# Patient Record
Sex: MALE | Race: WHITE | NOT HISPANIC OR LATINO | Employment: UNEMPLOYED | URBAN - METROPOLITAN AREA
[De-identification: names, ages, dates, MRNs, and addresses within clinical notes are randomized per-mention and may not be internally consistent; named-entity substitution may affect disease eponyms.]

---

## 2023-12-04 ENCOUNTER — TRANSCRIBE ORDERS (OUTPATIENT)
Dept: VASCULAR SURGERY | Facility: CLINIC | Age: 28
End: 2023-12-04

## 2023-12-04 DIAGNOSIS — I82.409 DVT (DEEP VENOUS THROMBOSIS) (H): Primary | ICD-10-CM

## 2023-12-05 ENCOUNTER — ANCILLARY PROCEDURE (OUTPATIENT)
Dept: VASCULAR ULTRASOUND | Facility: CLINIC | Age: 28
End: 2023-12-05
Attending: PODIATRIST
Payer: COMMERCIAL

## 2023-12-05 DIAGNOSIS — I82.409 DVT (DEEP VENOUS THROMBOSIS) (H): ICD-10-CM

## 2023-12-05 PROCEDURE — 93971 EXTREMITY STUDY: CPT | Mod: LT

## 2024-10-31 ENCOUNTER — APPOINTMENT (OUTPATIENT)
Dept: CT IMAGING | Facility: HOSPITAL | Age: 29
End: 2024-10-31
Attending: EMERGENCY MEDICINE

## 2024-10-31 ENCOUNTER — APPOINTMENT (OUTPATIENT)
Dept: RADIOLOGY | Facility: HOSPITAL | Age: 29
End: 2024-10-31
Attending: HOSPITALIST

## 2024-10-31 ENCOUNTER — NURSE TRIAGE (OUTPATIENT)
Dept: FAMILY MEDICINE | Facility: CLINIC | Age: 29
End: 2024-10-31

## 2024-10-31 ENCOUNTER — HOSPITAL ENCOUNTER (OUTPATIENT)
Facility: HOSPITAL | Age: 29
Setting detail: OBSERVATION
Discharge: HOME | End: 2024-11-01
Attending: EMERGENCY MEDICINE | Admitting: STUDENT IN AN ORGANIZED HEALTH CARE EDUCATION/TRAINING PROGRAM

## 2024-10-31 DIAGNOSIS — N20.1 URETERAL STONE: Primary | ICD-10-CM

## 2024-10-31 DIAGNOSIS — R11.2 NAUSEA AND VOMITING, UNSPECIFIED VOMITING TYPE: ICD-10-CM

## 2024-10-31 DIAGNOSIS — R10.32 ABDOMINAL PAIN, LEFT LOWER QUADRANT: ICD-10-CM

## 2024-10-31 DIAGNOSIS — N20.1 URETEROLITHIASIS: ICD-10-CM

## 2024-10-31 DIAGNOSIS — N17.9 ACUTE KIDNEY INJURY (H): ICD-10-CM

## 2024-10-31 PROBLEM — E66.01 MORBID OBESITY (H): Status: ACTIVE | Noted: 2024-10-31

## 2024-10-31 PROBLEM — R79.82 CRP ELEVATED: Status: ACTIVE | Noted: 2024-10-31

## 2024-10-31 PROBLEM — N30.00 ACUTE CYSTITIS WITHOUT HEMATURIA: Status: ACTIVE | Noted: 2024-10-31

## 2024-10-31 LAB
ALBUMIN SERPL BCG-MCNC: 4.5 G/DL (ref 3.5–5.2)
ALBUMIN UR-MCNC: 20 MG/DL
ALP SERPL-CCNC: 89 U/L (ref 40–150)
ALT SERPL W P-5'-P-CCNC: 57 U/L (ref 0–70)
ANION GAP SERPL CALCULATED.3IONS-SCNC: 13 MMOL/L (ref 7–15)
APPEARANCE UR: CLEAR
AST SERPL W P-5'-P-CCNC: 29 U/L (ref 0–45)
BILIRUB DIRECT SERPL-MCNC: <0.2 MG/DL (ref 0–0.3)
BILIRUB SERPL-MCNC: 0.7 MG/DL
BILIRUB UR QL STRIP: NEGATIVE
BUN SERPL-MCNC: 11.8 MG/DL (ref 6–20)
CALCIUM SERPL-MCNC: 9.2 MG/DL (ref 8.8–10.4)
CHLORIDE SERPL-SCNC: 103 MMOL/L (ref 98–107)
COLOR UR AUTO: ABNORMAL
CREAT SERPL-MCNC: 1.35 MG/DL (ref 0.67–1.17)
CRP SERPL-MCNC: 15.6 MG/L
EGFRCR SERPLBLD CKD-EPI 2021: 72 ML/MIN/1.73M2
ERYTHROCYTE [DISTWIDTH] IN BLOOD BY AUTOMATED COUNT: 12.8 % (ref 10–15)
GLUCOSE SERPL-MCNC: 109 MG/DL (ref 70–99)
GLUCOSE UR STRIP-MCNC: NEGATIVE MG/DL
HCO3 SERPL-SCNC: 24 MMOL/L (ref 22–29)
HCT VFR BLD AUTO: 43 % (ref 40–53)
HGB BLD-MCNC: 14.7 G/DL (ref 13.3–17.7)
HGB UR QL STRIP: ABNORMAL
KETONES UR STRIP-MCNC: ABNORMAL MG/DL
LACTATE SERPL-SCNC: 1.1 MMOL/L (ref 0.7–2)
LEUKOCYTE ESTERASE UR QL STRIP: NEGATIVE
LIPASE SERPL-CCNC: 13 U/L (ref 13–60)
MCH RBC QN AUTO: 28.6 PG (ref 26.5–33)
MCHC RBC AUTO-ENTMCNC: 34.2 G/DL (ref 31.5–36.5)
MCV RBC AUTO: 84 FL (ref 78–100)
MUCOUS THREADS #/AREA URNS LPF: PRESENT /LPF
NITRATE UR QL: NEGATIVE
PH UR STRIP: 6 [PH] (ref 5–7)
PLATELET # BLD AUTO: 259 10E3/UL (ref 150–450)
POTASSIUM SERPL-SCNC: 4.1 MMOL/L (ref 3.4–5.3)
PROT SERPL-MCNC: 7.2 G/DL (ref 6.4–8.3)
RBC # BLD AUTO: 5.14 10E6/UL (ref 4.4–5.9)
RBC URINE: 135 /HPF
SODIUM SERPL-SCNC: 140 MMOL/L (ref 135–145)
SP GR UR STRIP: 1.01 (ref 1–1.03)
UROBILINOGEN UR STRIP-MCNC: <2 MG/DL
WBC # BLD AUTO: 16 10E3/UL (ref 4–11)
WBC URINE: 2 /HPF

## 2024-10-31 PROCEDURE — 96361 HYDRATE IV INFUSION ADD-ON: CPT

## 2024-10-31 PROCEDURE — 96365 THER/PROPH/DIAG IV INF INIT: CPT | Mod: 59

## 2024-10-31 PROCEDURE — 120N000001 HC R&B MED SURG/OB

## 2024-10-31 PROCEDURE — 96374 THER/PROPH/DIAG INJ IV PUSH: CPT | Mod: 59

## 2024-10-31 PROCEDURE — 96375 TX/PRO/DX INJ NEW DRUG ADDON: CPT

## 2024-10-31 PROCEDURE — 258N000003 HC RX IP 258 OP 636: Performed by: HOSPITALIST

## 2024-10-31 PROCEDURE — 99285 EMERGENCY DEPT VISIT HI MDM: CPT | Mod: 25

## 2024-10-31 PROCEDURE — 71045 X-RAY EXAM CHEST 1 VIEW: CPT

## 2024-10-31 PROCEDURE — 99223 1ST HOSP IP/OBS HIGH 75: CPT | Performed by: HOSPITALIST

## 2024-10-31 PROCEDURE — 93005 ELECTROCARDIOGRAM TRACING: CPT | Performed by: HOSPITALIST

## 2024-10-31 PROCEDURE — 82947 ASSAY GLUCOSE BLOOD QUANT: CPT | Performed by: EMERGENCY MEDICINE

## 2024-10-31 PROCEDURE — 250N000011 HC RX IP 250 OP 636: Performed by: EMERGENCY MEDICINE

## 2024-10-31 PROCEDURE — 83690 ASSAY OF LIPASE: CPT | Performed by: EMERGENCY MEDICINE

## 2024-10-31 PROCEDURE — 83605 ASSAY OF LACTIC ACID: CPT | Performed by: EMERGENCY MEDICINE

## 2024-10-31 PROCEDURE — 258N000003 HC RX IP 258 OP 636: Performed by: EMERGENCY MEDICINE

## 2024-10-31 PROCEDURE — 36415 COLL VENOUS BLD VENIPUNCTURE: CPT | Performed by: EMERGENCY MEDICINE

## 2024-10-31 PROCEDURE — G0378 HOSPITAL OBSERVATION PER HR: HCPCS

## 2024-10-31 PROCEDURE — 81001 URINALYSIS AUTO W/SCOPE: CPT | Performed by: EMERGENCY MEDICINE

## 2024-10-31 PROCEDURE — 86140 C-REACTIVE PROTEIN: CPT | Performed by: EMERGENCY MEDICINE

## 2024-10-31 PROCEDURE — 74177 CT ABD & PELVIS W/CONTRAST: CPT

## 2024-10-31 PROCEDURE — 85027 COMPLETE CBC AUTOMATED: CPT | Performed by: EMERGENCY MEDICINE

## 2024-10-31 PROCEDURE — 82248 BILIRUBIN DIRECT: CPT | Performed by: EMERGENCY MEDICINE

## 2024-10-31 PROCEDURE — 250N000011 HC RX IP 250 OP 636: Performed by: HOSPITALIST

## 2024-10-31 RX ORDER — HYDROMORPHONE HCL IN WATER/PF 6 MG/30 ML
0.4 PATIENT CONTROLLED ANALGESIA SYRINGE INTRAVENOUS
Status: DISCONTINUED | OUTPATIENT
Start: 2024-10-31 | End: 2024-11-01 | Stop reason: HOSPADM

## 2024-10-31 RX ORDER — CEFTRIAXONE 1 G/1
1 INJECTION, POWDER, FOR SOLUTION INTRAMUSCULAR; INTRAVENOUS EVERY 24 HOURS
Status: DISCONTINUED | OUTPATIENT
Start: 2024-10-31 | End: 2024-10-31

## 2024-10-31 RX ORDER — ONDANSETRON 4 MG/1
4 TABLET, ORALLY DISINTEGRATING ORAL EVERY 6 HOURS PRN
Status: DISCONTINUED | OUTPATIENT
Start: 2024-10-31 | End: 2024-11-01 | Stop reason: HOSPADM

## 2024-10-31 RX ORDER — CEFTRIAXONE 2 G/1
2 INJECTION, POWDER, FOR SOLUTION INTRAMUSCULAR; INTRAVENOUS EVERY 24 HOURS
Status: DISCONTINUED | OUTPATIENT
Start: 2024-10-31 | End: 2024-10-31

## 2024-10-31 RX ORDER — ACETAMINOPHEN 650 MG/1
650 SUPPOSITORY RECTAL EVERY 4 HOURS PRN
Status: DISCONTINUED | OUTPATIENT
Start: 2024-10-31 | End: 2024-11-01 | Stop reason: HOSPADM

## 2024-10-31 RX ORDER — AMOXICILLIN 250 MG
2 CAPSULE ORAL 2 TIMES DAILY PRN
Status: DISCONTINUED | OUTPATIENT
Start: 2024-10-31 | End: 2024-11-01 | Stop reason: HOSPADM

## 2024-10-31 RX ORDER — FLUTICASONE PROPIONATE AND SALMETEROL 500; 50 UG/1; UG/1
1 POWDER RESPIRATORY (INHALATION) EVERY MORNING
COMMUNITY
Start: 2024-07-10

## 2024-10-31 RX ORDER — KETOROLAC TROMETHAMINE 15 MG/ML
15 INJECTION, SOLUTION INTRAMUSCULAR; INTRAVENOUS ONCE
Status: COMPLETED | OUTPATIENT
Start: 2024-10-31 | End: 2024-10-31

## 2024-10-31 RX ORDER — ALBUTEROL SULFATE 90 UG/1
2 INHALANT RESPIRATORY (INHALATION) EVERY 6 HOURS PRN
COMMUNITY

## 2024-10-31 RX ORDER — ONDANSETRON 2 MG/ML
4 INJECTION INTRAMUSCULAR; INTRAVENOUS ONCE
Status: COMPLETED | OUTPATIENT
Start: 2024-10-31 | End: 2024-10-31

## 2024-10-31 RX ORDER — ONDANSETRON 2 MG/ML
4 INJECTION INTRAMUSCULAR; INTRAVENOUS EVERY 6 HOURS PRN
Status: DISCONTINUED | OUTPATIENT
Start: 2024-10-31 | End: 2024-11-01 | Stop reason: HOSPADM

## 2024-10-31 RX ORDER — AMOXICILLIN 250 MG
1 CAPSULE ORAL 2 TIMES DAILY PRN
Status: DISCONTINUED | OUTPATIENT
Start: 2024-10-31 | End: 2024-11-01 | Stop reason: HOSPADM

## 2024-10-31 RX ORDER — ACETAMINOPHEN 325 MG/1
650 TABLET ORAL EVERY 4 HOURS PRN
Status: DISCONTINUED | OUTPATIENT
Start: 2024-10-31 | End: 2024-11-01 | Stop reason: HOSPADM

## 2024-10-31 RX ORDER — OXYCODONE HYDROCHLORIDE 5 MG/1
5 TABLET ORAL EVERY 4 HOURS PRN
Status: DISCONTINUED | OUTPATIENT
Start: 2024-10-31 | End: 2024-11-01 | Stop reason: HOSPADM

## 2024-10-31 RX ORDER — IBUPROFEN 400 MG/1
400 TABLET, FILM COATED ORAL EVERY 8 HOURS PRN
COMMUNITY

## 2024-10-31 RX ORDER — IOPAMIDOL 755 MG/ML
100 INJECTION, SOLUTION INTRAVASCULAR ONCE
Status: COMPLETED | OUTPATIENT
Start: 2024-10-31 | End: 2024-10-31

## 2024-10-31 RX ORDER — ESOMEPRAZOLE MAGNESIUM 40 MG/1
40 CAPSULE, DELAYED RELEASE ORAL
COMMUNITY

## 2024-10-31 RX ORDER — HYDROMORPHONE HCL IN WATER/PF 6 MG/30 ML
0.2 PATIENT CONTROLLED ANALGESIA SYRINGE INTRAVENOUS
Status: DISCONTINUED | OUTPATIENT
Start: 2024-10-31 | End: 2024-11-01 | Stop reason: HOSPADM

## 2024-10-31 RX ADMIN — SODIUM CHLORIDE 1000 ML: 9 INJECTION, SOLUTION INTRAVENOUS at 19:51

## 2024-10-31 RX ADMIN — CEFTRIAXONE SODIUM 2 G: 2 INJECTION, POWDER, FOR SOLUTION INTRAMUSCULAR; INTRAVENOUS at 21:58

## 2024-10-31 RX ADMIN — ONDANSETRON 4 MG: 2 INJECTION INTRAMUSCULAR; INTRAVENOUS at 19:46

## 2024-10-31 RX ADMIN — HYDROMORPHONE HYDROCHLORIDE 0.5 MG: 1 INJECTION, SOLUTION INTRAMUSCULAR; INTRAVENOUS; SUBCUTANEOUS at 21:49

## 2024-10-31 RX ADMIN — SODIUM CHLORIDE 1000 ML: 9 INJECTION, SOLUTION INTRAVENOUS at 22:28

## 2024-10-31 RX ADMIN — IOPAMIDOL 100 ML: 755 INJECTION, SOLUTION INTRAVENOUS at 20:58

## 2024-10-31 RX ADMIN — KETOROLAC TROMETHAMINE 15 MG: 15 INJECTION, SOLUTION INTRAMUSCULAR; INTRAVENOUS at 19:47

## 2024-10-31 RX ADMIN — FAMOTIDINE 20 MG: 10 INJECTION, SOLUTION INTRAVENOUS at 19:51

## 2024-10-31 ASSESSMENT — ACTIVITIES OF DAILY LIVING (ADL)
ADLS_ACUITY_SCORE: 0
ADLS_ACUITY_SCORE: 0

## 2024-10-31 ASSESSMENT — COLUMBIA-SUICIDE SEVERITY RATING SCALE - C-SSRS
6. HAVE YOU EVER DONE ANYTHING, STARTED TO DO ANYTHING, OR PREPARED TO DO ANYTHING TO END YOUR LIFE?: NO
2. HAVE YOU ACTUALLY HAD ANY THOUGHTS OF KILLING YOURSELF IN THE PAST MONTH?: NO
1. IN THE PAST MONTH, HAVE YOU WISHED YOU WERE DEAD OR WISHED YOU COULD GO TO SLEEP AND NOT WAKE UP?: NO

## 2024-10-31 NOTE — LETTER
[facility] EMERGENCY DEPARTMENT  [address] [facility] [address]  Phone: [phone]    November 1, 2024        Yusef Camp  [facility] [facility] [address] [place] [id number]          To whom it may concern:    RE: Yusef Camp    Patient was seen admitted to the hospital from 10/31 - 11/1/2024    Please contact me for questions or concerns.      Sincerely,      Prince Sanchez DO

## 2024-10-31 NOTE — TELEPHONE ENCOUNTER
Gina, spouse (not on c2c. Writer received verbal c2c from patient), contacts clinic expressing that patient, for the last 3 hours, has been experiencing severe LLQ abdominal pain that intensifies with palpation. Rates pain 8-10/10. Patient tried milk of magnesia for constipation, but did vomit afterwards. Patient specifies that there was blood present in emesis.     Last bowel movement was this morning, small.   -Unsure if there was blood in stool.   -Denies diarrhea.     Writer emphasized importance of ED visit. Patient + spouse are concerned about cost of ED visit, stating that they do not have insurance, but ultimately agreed to visit the ED.     Reason for Disposition   SEVERE abdominal pain (e.g., excruciating)    Additional Information   Negative: Passed out (i.e., fainted, collapsed and was not responding)   Negative: Shock suspected (e.g., cold/pale/clammy skin, too weak to stand, low BP, rapid pulse)   Negative: Sounds like a life-threatening emergency to the triager   Negative: Followed an abdomen (stomach) injury   Negative: Chest pain   Negative: Pain is mainly in upper abdomen (if needed ask: 'is it mainly above the belly button?')   Negative: Abdomen bloating or swelling are main symptoms    Protocols used: Abdominal Pain - Male-A-OH

## 2024-10-31 NOTE — ED TRIAGE NOTES
Pt ambulatory to triage with c/o LLQ abdominal pain, n/v, that started 3 hours ago. Took tylenol at 1730 with no relief of pain.  LBM today- reports normal.  Denies any medical hx.      Triage Assessment (Adult)       Row Name 10/31/24 [id number]          Triage Assessment    Airway WDL WDL        Respiratory WDL    Respiratory WDL WDL        Skin Circulation/Temperature WDL    Skin Circulation/Temperature WDL WDL        Cardiac WDL    Cardiac WDL WDL        Peripheral/Neurovascular WDL    Peripheral Neurovascular WDL WDL        Cognitive/Neuro/Behavioral WDL    Cognitive/Neuro/Behavioral WDL WDL

## 2024-11-01 VITALS
RESPIRATION RATE: 18 BRPM | OXYGEN SATURATION: 95 % | DIASTOLIC BLOOD PRESSURE: 77 MMHG | SYSTOLIC BLOOD PRESSURE: 122 MMHG | WEIGHT: 315 LBS | HEART RATE: 93 BPM | HEIGHT: 78 IN | TEMPERATURE: 98 F | BODY MASS INDEX: 36.45 KG/M2

## 2024-11-01 PROBLEM — N17.9 ACUTE KIDNEY INJURY (H): Status: ACTIVE | Noted: 2024-11-01

## 2024-11-01 LAB
ANION GAP SERPL CALCULATED.3IONS-SCNC: 9 MMOL/L (ref 7–15)
BASOPHILS # BLD AUTO: 0 10E3/UL (ref 0–0.2)
BASOPHILS NFR BLD AUTO: 0 %
BUN SERPL-MCNC: 10.3 MG/DL (ref 6–20)
CALCIUM SERPL-MCNC: 8.5 MG/DL (ref 8.8–10.4)
CHLORIDE SERPL-SCNC: 107 MMOL/L (ref 98–107)
CREAT SERPL-MCNC: 1.11 MG/DL (ref 0.67–1.17)
EGFRCR SERPLBLD CKD-EPI 2021: >90 ML/MIN/1.73M2
EOSINOPHIL # BLD AUTO: 0.3 10E3/UL (ref 0–0.7)
EOSINOPHIL NFR BLD AUTO: 3 %
ERYTHROCYTE [DISTWIDTH] IN BLOOD BY AUTOMATED COUNT: 13.1 % (ref 10–15)
GLUCOSE SERPL-MCNC: 105 MG/DL (ref 70–99)
HCO3 SERPL-SCNC: 25 MMOL/L (ref 22–29)
HCT VFR BLD AUTO: 41.9 % (ref 40–53)
HGB BLD-MCNC: 13.7 G/DL (ref 13.3–17.7)
IMM GRANULOCYTES # BLD: 0 10E3/UL
IMM GRANULOCYTES NFR BLD: 0 %
LYMPHOCYTES # BLD AUTO: 1.6 10E3/UL (ref 0.8–5.3)
LYMPHOCYTES NFR BLD AUTO: 16 %
MCH RBC QN AUTO: 28.2 PG (ref 26.5–33)
MCHC RBC AUTO-ENTMCNC: 32.7 G/DL (ref 31.5–36.5)
MCV RBC AUTO: 86 FL (ref 78–100)
MONOCYTES # BLD AUTO: 0.8 10E3/UL (ref 0–1.3)
MONOCYTES NFR BLD AUTO: 8 %
NEUTROPHILS # BLD AUTO: 7.1 10E3/UL (ref 1.6–8.3)
NEUTROPHILS NFR BLD AUTO: 73 %
NRBC # BLD AUTO: 0 10E3/UL
NRBC BLD AUTO-RTO: 0 /100
PLATELET # BLD AUTO: 237 10E3/UL (ref 150–450)
POTASSIUM SERPL-SCNC: 4.1 MMOL/L (ref 3.4–5.3)
RBC # BLD AUTO: 4.86 10E6/UL (ref 4.4–5.9)
SODIUM SERPL-SCNC: 141 MMOL/L (ref 135–145)
WBC # BLD AUTO: 9.7 10E3/UL (ref 4–11)

## 2024-11-01 PROCEDURE — 250N000011 HC RX IP 250 OP 636: Performed by: HOSPITALIST

## 2024-11-01 PROCEDURE — 99232 SBSQ HOSP IP/OBS MODERATE 35: CPT | Performed by: STUDENT IN AN ORGANIZED HEALTH CARE EDUCATION/TRAINING PROGRAM

## 2024-11-01 PROCEDURE — 80048 BASIC METABOLIC PNL TOTAL CA: CPT | Performed by: HOSPITALIST

## 2024-11-01 PROCEDURE — 36415 COLL VENOUS BLD VENIPUNCTURE: CPT | Performed by: HOSPITALIST

## 2024-11-01 PROCEDURE — 96361 HYDRATE IV INFUSION ADD-ON: CPT

## 2024-11-01 PROCEDURE — G0378 HOSPITAL OBSERVATION PER HR: HCPCS

## 2024-11-01 PROCEDURE — 85004 AUTOMATED DIFF WBC COUNT: CPT | Performed by: HOSPITALIST

## 2024-11-01 PROCEDURE — 99239 HOSP IP/OBS DSCHRG MGMT >30: CPT | Performed by: STUDENT IN AN ORGANIZED HEALTH CARE EDUCATION/TRAINING PROGRAM

## 2024-11-01 PROCEDURE — 96376 TX/PRO/DX INJ SAME DRUG ADON: CPT

## 2024-11-01 RX ORDER — TAMSULOSIN HYDROCHLORIDE 0.4 MG/1
0.4 CAPSULE ORAL DAILY
Qty: 30 CAPSULE | Refills: 0 | Status: SHIPPED | OUTPATIENT
Start: 2024-11-01

## 2024-11-01 RX ORDER — NALOXONE HYDROCHLORIDE 0.4 MG/ML
0.2 INJECTION, SOLUTION INTRAMUSCULAR; INTRAVENOUS; SUBCUTANEOUS
Status: DISCONTINUED | OUTPATIENT
Start: 2024-11-01 | End: 2024-11-01 | Stop reason: HOSPADM

## 2024-11-01 RX ORDER — FLUTICASONE FUROATE AND VILANTEROL 100; 25 UG/1; UG/1
1 POWDER RESPIRATORY (INHALATION) DAILY
Status: CANCELLED | OUTPATIENT
Start: 2024-11-01

## 2024-11-01 RX ORDER — ALBUTEROL SULFATE 90 UG/1
2 INHALANT RESPIRATORY (INHALATION) EVERY 6 HOURS PRN
Status: CANCELLED | OUTPATIENT
Start: 2024-11-01

## 2024-11-01 RX ORDER — TRAMADOL HYDROCHLORIDE 50 MG/1
50 TABLET ORAL EVERY 6 HOURS PRN
Qty: 20 TABLET | Refills: 0 | Status: SHIPPED | OUTPATIENT
Start: 2024-11-01 | End: 2024-11-08

## 2024-11-01 RX ORDER — NALOXONE HYDROCHLORIDE 0.4 MG/ML
0.4 INJECTION, SOLUTION INTRAMUSCULAR; INTRAVENOUS; SUBCUTANEOUS
Status: DISCONTINUED | OUTPATIENT
Start: 2024-11-01 | End: 2024-11-01 | Stop reason: HOSPADM

## 2024-11-01 RX ORDER — ONDANSETRON 4 MG/1
4 TABLET, ORALLY DISINTEGRATING ORAL EVERY 6 HOURS PRN
Qty: 15 TABLET | Refills: 0 | Status: SHIPPED | OUTPATIENT
Start: 2024-11-01

## 2024-11-01 RX ORDER — PANTOPRAZOLE SODIUM 20 MG/1
40 TABLET, DELAYED RELEASE ORAL
Status: CANCELLED | OUTPATIENT
Start: 2024-11-02

## 2024-11-01 RX ADMIN — HYDROMORPHONE HYDROCHLORIDE 0.2 MG: 0.2 INJECTION, SOLUTION INTRAMUSCULAR; INTRAVENOUS; SUBCUTANEOUS at 09:28

## 2024-11-01 ASSESSMENT — ACTIVITIES OF DAILY LIVING (ADL)
ADLS_ACUITY_SCORE: 0

## 2024-11-01 NOTE — PLAN OF CARE
Goal Outcome Evaluation:Pt discharging home, discharge instruction given, family here for . Pt verbalized understanding of discharge instructions. Belongings with the pt.

## 2024-11-01 NOTE — CONSULTS
[facility] CONSULT      Type of Consult: emergency room   Place of Service:  [facility]   Reason for Consultation: left flank pain / 7x5 mm moderately obstructing stone with associated hydronephrosis  Consult Requested by: Dr. Eason    History of present illness:  Yusef Camp is a 29 year old male that was admitted to the hospital for ANGELA (acute kidney injury) (H). Urology was consulted for the above. History obtained through patient and chart review.     28yo M with no pertinent past medical history presented to the emergency department for left flank and lower quadrant pain. Found to have a 7x5 mm moderately obstructing left distal ureter stone. He endorses nausea and vomiting at home, now improved. Denies any fevers or chills. No dysuria hematuria, or urgency of urination but did have some frequency of urination yesterday. No history of kidney stones. Has never seen a urologist for any reason. Currently reports pain is well controlled with IV pain medications.     Afebrile and vitally stable.    Past medical history:  No past medical history on file.    Past surgical history:  No past surgical history on file.    Social history:  Social History     Socioeconomic History    Marital status:      Spouse name: Not on file    Number of children: Not on file    Years of education: Not on file    Highest education level: Not on file   Occupational History    Not on file   Tobacco Use    Smoking status: Not on file    Smokeless tobacco: Not on file   Substance and Sexual Activity    Alcohol use: Not on file    Drug use: Not on file    Sexual activity: Not on file   Other Topics Concern    Not on file   Social History Narrative    Not on file     Social Drivers of Health     Financial Resource Strain: Not on file   Food Insecurity: Not on file   Transportation Needs: Not on file   Physical Activity: Not on file   Stress: Not on file   Social Connections: Not on file   Interpersonal  Safety: Not on file   Housing Stability: Not on file       Medications:  Current Facility-Administered Medications   Medication Dose Route Frequency Provider Last Rate Last Admin    acetaminophen (TYLENOL) tablet 650 mg  650 mg Oral Q4H PRN Arben Eason MD        Or    acetaminophen (TYLENOL) Suppository 650 mg  650 mg Rectal Q4H PRN Arben Eason MD        HYDROmorphone (DILAUDID) injection 0.2 mg  0.2 mg Intravenous Q2H PRN Arben Eason MD   0.2 mg at 11/01/24 0928    HYDROmorphone (DILAUDID) injection 0.4 mg  0.4 mg Intravenous Q2H PRN Arben Eason MD        naloxone (NARCAN) injection 0.2 mg  0.2 mg Intravenous Q2 Min PRN Prince Sanchez DO        Or    naloxone (NARCAN) injection 0.4 mg  0.4 mg Intravenous Q2 Min PRN Prince Sanchez DO        Or    naloxone (NARCAN) injection 0.2 mg  0.2 mg Intramuscular Q2 Min PRN Prince Sanchez DO        Or    naloxone (NARCAN) injection 0.4 mg  0.4 mg Intramuscular Q2 Min PRN Prince Sanchez DO        ondansetron (ZOFRAN ODT) ODT tab 4 mg  4 mg Oral Q6H PRN Arben Eason MD        Or    ondansetron (ZOFRAN) injection 4 mg  4 mg Intravenous Q6H PRN Arben Eason MD        oxyCODONE (ROXICODONE) tablet 5 mg  5 mg Oral Q4H PRN Arben Eason MD        oxyCODONE IR (ROXICODONE) half-tab 2.5 mg  2.5 mg Oral Q4H PRN Arben Eason MD senna-docusate (SENOKOT-S/PERICOLACE) 8.6-50 MG per tablet 1 tablet  1 tablet Oral BID PRArben Longo MD        Or    senna-docusate (SENOKOT-S/PERICOLACE) 8.6-50 MG per tablet 2 tablet  2 tablet Oral BID PRArben Longo MD         Current Outpatient Medications   Medication Sig Dispense Refill    albuterol (PROAIR HFA/PROVENTIL HFA/VENTOLIN HFA) 108 (90 Base) MCG/ACT inhaler Inhale 2 puffs into the lungs every 6 hours as needed for shortness of breath, wheezing or cough.      esomeprazole (NEXIUM) 40 MG DR capsule Take 40 mg by mouth every morning (before breakfast). Take 30-60 minutes before eating.       "ibuprofen (ADVIL/MOTRIN) 400 MG tablet Take 400 mg by mouth every 8 hours as needed for moderate pain.      WIXELA INHUB 500-50 MCG/ACT inhaler Inhale 1 puff into the lungs every morning.         Allergies:  No Known Allergies    Review of systems:  A full 12 point review of systems was taken and is negative aside from what is noted above in the HPI.    PHYSICAL EXAM  /77   Pulse 93   Temp 98  F (36.7  C) (Oral)   Resp 18   Ht 2.032 m (6' 8\")   Wt (!) 171.9 kg (379 lb)   SpO2 95%   BMI 41.64 kg/m     General: NAD, alert, cooperative  Head: normocephalic, without abnormality / atraumatic  Abdomen: soft, tenderness to palpation of LLQ, non distended. No suprapubic fullness/tenderness. Mild left CVA tenderness noted  Geniturinary: voiding on his own   Skin: no rashes or lesions  Musculoskeletal: moves all four extremities equally  Psychological: alert and oriented, answers questions appropriately    Labs:     Lab Results   Component Value Date     11/01/2024     10/31/2024    CO2 25 11/01/2024    CO2 24 10/31/2024    BUN 10.3 11/01/2024    BUN 11.8 10/31/2024     Lab Results   Component Value Date    WBC 9.7 11/01/2024    WBC 16.0 10/31/2024    HGB 13.7 11/01/2024    HGB 14.7 10/31/2024    HCT 41.9 11/01/2024    HCT 43.0 10/31/2024    MCV 86 11/01/2024    MCV 84 10/31/2024     11/01/2024     10/31/2024       Lab Results   Component Value Date    NITRITE Negative 10/31/2024          Lab Results: personally reviewed     Imaging:  EXAM: CT ABDOMEN PELVIS W CONTRAST  LOCATION: [facility]  DATE: 10/31/2024     INDICATION: LLQ and flank pain  COMPARISON: None.  TECHNIQUE: CT scan of the abdomen and pelvis was performed following injection of IV contrast. Multiplanar reformats were obtained. Dose reduction techniques were used.  CONTRAST: isovue 370 100ml     FINDINGS:   LOWER CHEST: Normal.     HEPATOBILIARY: Mild diffuse hepatic steatosis.     PANCREAS: " Normal.     SPLEEN: Normal.     ADRENAL GLANDS: Normal.     KIDNEYS/BLADDER: Moderately obstructing 7 x 5 mm stone lower ureter 4 cm from the ureteral vesicle junction. No other stones on either side.     BOWEL: Normal.     LYMPH NODES: Normal.     VASCULATURE: Normal.     PELVIC ORGANS: Normal.     MUSCULOSKELETAL: Normal.                                                                      IMPRESSION:   1.  Moderately obstructing 7 x 5 mm stone distal left ureter 4 cm from the ureterovesical junction.     2.  No other stones.     I have personally reviewed the imaging reports above.     Assessment/plan: Yusef Camp is being seen by [facility] for flank pain, 7x5 mm obstructing stone with hydronephrosis, patient does have other stones noted      - Patient is afebrile and is hemodynamically stable, patient's white blood cell count is within the expected range and UA is not suggestive of an infection,   - Given above did discuss with patient options including medical expulsive therapy, ureteral stenting, and ureteroscopy with holmium laser lithotripsy as available options for management of obstructing stone, and the risks and benefits associated with each  - patient would like to try medical expulsive therapy, given this recommend allowing the patient to resume a normal diet, trial of oral analgesics, and if pain is controlled, patient can discharge home with close follow-up with [facility]   - recommend continuing to strain all urine until stone is collected   - patient should start Flomax 0.4mg 1 po daily if there is no medical contra indication     - Our office to coordinate follow up in 1-2 weeks.     The patient and I dicussed treatment options and their alternatives, including the risks and benefits of each. We discussed the importance of treatment and that left untreated stones can lead to potential renal function deterioration after 4 to 6 weeks and increased risk of infection and the  complications associated with urinary infections. Patient demonstrated understanding. All questions and concerns were answered in detail.     This case was discussed with: Dr Frantz Mendoza     Thank you for consulting [facility] regarding this patient's care. Please contact us with questions or concerns.     Nimo Canchola PA-C  [facility]   [phone]

## 2024-11-01 NOTE — PROGRESS NOTES
"[facility]    Medicine Progress Note - Hospitalist Service    Date of Admission:  10/31/2024    Assessment & Plan   Yusef Camp is a 29-year-old male who presented with left flank pain, found to have ureteral stone, and ANGELA    ANGELA (acute kidney injury) (H)  Can be prerenal from dehydration or from obstructive obstruction  - improved with IVF     Left ureteral stone  Pain improved  Urology consulted for possible stent placement  - Flomax  - Multimodal flank pain management     Leukocytosis CRP elevated  Did not notice any active infectious etiology, pain control supportive cares and establish with primary care for ongoing workup and follow-ups     Patient also has a BMI of 41 with possible history of sleep apnea  - outpatient follow up with PCP for RASHAD          Diet: NPO per Anesthesia Guidelines for Procedure/Surgery Except for: Meds    DVT Prophylaxis: Low Risk/Ambulatory with no VTE prophylaxis indicated  Guajardo Catheter: Not present  Lines: None     Cardiac Monitoring: None  Code Status: Full Code      Clinically Significant Risk Factors Present on Admission                           # Severe Obesity: Estimated body mass index is 41.64 kg/m  as calculated from the following:    Height as of this encounter: 2.032 m (6' 8\").    Weight as of this encounter: 171.9 kg (379 lb).              Disposition Plan     Medically Ready for Discharge: Anticipated Today or tomorrow to home          Prince Sanchez DO  Hospitalist Service  [facility]  Securely message with [employer] (more info)  Text page via [employer] Paging/Directory   ______________________________________________________________________    Interval History       Physical Exam   Vital Signs: Temp: 98.3  F (36.8  C) Temp src: Oral BP: (!) 141/78 Pulse: 93   Resp: 18 SpO2: (!) 84 % O2 Device: Nasal cannula Oxygen Delivery: 3 LPM  Weight: 379 lbs 0 oz    GEN: in no acute distress, alert and answers questions " appropriately  HEENT: Moist mucus membranes, anicteric sclerae  NECK: symmetric without tracheal deviation  CV: reg rhythm, normal rate, audible s1 and s2, no murmurs / rubs / gallops  RESP: clear to auscultation, no rhonchi / rales / wheezes  ABD: non-distended  EXT: no peripheral edema  SKIN: no suspicious skin findings, warm  NEURO: moves all four extremities, no focal deficits    Medical Decision Making       40 MINUTES SPENT BY ME on the date of service doing chart review, history, exam, documentation & further activities per the note.      Data   ------------------------- PAST 24 HR DATA REVIEWED -----------------------------------------------    I have personally reviewed the following data over the past 24 hrs:    16.0 (H)  \   14.7   / 259     140 103 11.8 /  109 (H)   4.1 24 1.35 (H) \     ALT: 57 AST: 29 AP: 89 TBILI: 0.7   ALB: 4.5 TOT PROTEIN: 7.2 LIPASE: 13     Procal: N/A CRP: 15.60 (H) Lactic Acid: 1.1         Imaging results reviewed over the past 24 hrs:   Recent Results (from the past 24 hours)   CT Abdomen Pelvis w Contrast    Narrative    EXAM: CT ABDOMEN PELVIS W CONTRAST  LOCATION: [facility]  DATE: 10/31/2024    INDICATION: LLQ and flank pain  COMPARISON: None.  TECHNIQUE: CT scan of the abdomen and pelvis was performed following injection of IV contrast. Multiplanar reformats were obtained. Dose reduction techniques were used.  CONTRAST: isovue 370 100ml    FINDINGS:   LOWER CHEST: Normal.    HEPATOBILIARY: Mild diffuse hepatic steatosis.    PANCREAS: Normal.    SPLEEN: Normal.    ADRENAL GLANDS: Normal.    KIDNEYS/BLADDER: Moderately obstructing 7 x 5 mm stone lower ureter 4 cm from the ureteral vesicle junction. No other stones on either side.    BOWEL: Normal.    LYMPH NODES: Normal.    VASCULATURE: Normal.    PELVIC ORGANS: Normal.    MUSCULOSKELETAL: Normal.      Impression    IMPRESSION:   1.  Moderately obstructing 7 x 5 mm stone distal left ureter 4 cm from  the ureterovesical junction.    2.  No other stones.   XR Chest Port 1 View    Narrative    EXAM: XR CHEST PORT 1 VIEW  LOCATION: [facility]  DATE: 10/31/2024    INDICATION: CRP  WBC high   r o PNA  COMPARISON: None.      Impression    IMPRESSION: Negative chest.

## 2024-11-01 NOTE — ED PROVIDER NOTES
EMERGENCY DEPARTMENT ENCOUNTER      NAME: Yusef Camp  AGE: 29 year old male  YOB: 1995  MRN: [id number]  EVALUATION DATE & TIME: No admission date for patient encounter.    ED PROVIDER: Brea Stern MD    Chief Complaint   Patient presents with    Abdominal Pain    Nausea & Vomiting       FINAL IMPRESSION  1. Ureterolithiasis    2. Abdominal pain, left lower quadrant    3. Nausea and vomiting, unspecified vomiting type    4. Acute kidney injury (H)        MEDICAL DECISION MAKING   Yusef Camp is a 29 year old male who presents for evaluation of abdominal pain, nausea, and vomiting.  Patient reports onset of symptoms this afternoon, describing left lower quadrant abdominal pain with radiation down into his left testicle.  He reports associated nausea and vomiting.  He did try taking some Tylenol but reports that he had no improvement in pain and actually vomited shortly thereafter.  He has not any associated diarrhea or constipation he also denies associated fever, chills, hematuria, dysuria, testicular pain/swelling, penile discharge.  He has no history of kidney stones or kidney infections, abdominal surgeries.    I considered a broad differential including but not limited to diverticulitis, ureterolithiasis, pyelonephritis, cystitis, hernia, small bowel obstruction/ileus.  Also considered epididymitis, testicular torsion, hydrocele, varicocele, or other  pathology but feel this less likely given history and exam.  Discussed options for workup and management with the patient.  We have agreed on plan for labs, UA, CT abdomen/pelvis, and management of symptoms with IV fluids, IV analgesic/antiemetic.    ED Course as of 11/01/24 0107   Thu Oct 31, 2024   2044 CBC (+ platelets, no diff)(!)  CBC notable for leukocytosis with WBC of 16.0, concerning for infectious/inflammatory process.   2045 Basic metabolic panel(!)  BMP with creatinine of 1.35, concerning for acute kidney injury.  No  other electrolyte derangement, acidosis.  Will continue IV fluids.   2045 Hepatic function panel  LFTs reassuring. No acute elevation of bilirubin or transaminates to suggest acute hepatobiliary process.    2045 CRP Inflammation(!): 15.60  Elevated, concerning for infectious/amatory process.   2045 Lipase: 13  Lipase within normal limits, symptoms less likely related to acute pancreatitis.     2045 Lactic Acid: 1.1  Lactate within normal limits, less likely end-organ ischemia or systemic infectious process.    2111 CT Abdomen Pelvis w Contrast  CT with moderately obstructing 7 x 5 mm stone distal left ureter 4 cm from the ureterovesical junction. I suspect this to be etiology of patient's symptoms.    2146 UA with Microscopic reflex to Culture(!)  UA without evidence of infection but does reveal significant RBCs and blood.     Workup today was notable for the above.  I rechecked the patient multiple times.  Unfortunately, it was fairly difficult to control symptoms despite multiple interventions here.  Given size of the stone, I do have concern that patient may not pass this without stenting or other intervention by urology team.  I offered admission, which she was much in agreement with.  I discussed the case with Dr. Cortez with [facility] who agreed with workup and management thus far.  Fortunately, UA did not show any evidence of concurrent infection so I do not believe that emergent stenting will be necessary.  I discussed case with hospitalist who agreed to facilitate admission.  Patient was given additional fluids and IV analgesic with some improvement in symptoms.          Considerations in Medical Decision Making  History:  Obtained supplemental history: Supplemental history obtained?: Family Member/Significant Other  Reviewed external records: External records reviewed?: No  Care impacted by chronic illness: Documented in Chart    Work Up:  In additional to work up documented, I considered the  "following work up: Documented in chart, if applicable.  Chart documentation includes differential considered and any EKGs or imaging independently interpreted by provider, where specified.    External consultation:  Discussion of management with another provider: Documented in chart, if applicable    Disposition considerations: Admit.    MIPS Documentation: Not Applicable       ED COURSE  7:31 PM I met with the patient, obtained history, performed an initial exam, and discussed options and plan for diagnostics and treatment here in the ED.   9:12 PM I rechecked and updated the patient on results.    9:32 PM I spoke with the accepting hospitalist, Dr. Eason.  9:37 PM Spoke with [facility], Dr. Cortez.    MEDICATIONS GIVEN IN THE ED  Medications   HYDROmorphone (DILAUDID) injection 0.5 mg (has no administration in time range)   sodium chloride 0.9% BOLUS 1,000 mL (0 mLs Intravenous Stopped 10/31/24 2051)   ondansetron (ZOFRAN) injection 4 mg (4 mg Intravenous $Given 10/31/24 1946)   ketorolac (TORADOL) injection 15 mg (15 mg Intravenous $Given 10/31/24 1947)   famotidine (PEPCID) injection 20 mg (20 mg Intravenous $Given 10/31/24 1951)   iopamidol (ISOVUE-370) solution 100 mL (100 mLs Intravenous $Given 10/31/24 2058)       NEW PRESCRIPTIONS STARTED AT TODAY'S VISIT  New Prescriptions    No medications on file          =================================================================    HPI:    Use of : N/A      Yusef Camp is a 29 year old male who presents with abdominal pain.    The patient presents with abdominal pain that is on his LLQ near his waistline that began around 2:40 PM. The pain is \"jabbing\" and he has nausea with vomiting x2. He describes the pain as \"jabbing\" and shoots to his left testicle and radiates around to his left flank. He was given 2x Tylenol 500 mg at 5:30 PM but vomited them up. He felt fine yesterday.     No new foods. No history of kidney stones. No abdominal " "surgical history. No known allergies. No testicular swelling or redness. He denies fever, hematuria, dysuria, chest pain, shortness of breath, or any other complaints at this time.       RELEVANT HISTORY, MEDICATIONS, & ALLERGIES   Past medical history, surgical history, family history, medications, and allergies reviewed and pertinent noted in HPI.    REVIEW OF SYSTEMS:  A complete review of systems was performed with pertinent positives and negatives noted in the HPI. All other systems negative.     PHYSICAL EXAM:    Vitals: BP (!) 141/78   Pulse 93   Temp 98.3  F (36.8  C) (Oral)   Resp 18   Ht 2.032 m (6' 8\")   Wt (!) 171.9 kg (379 lb)   SpO2 96%   BMI 41.64 kg/m     General: Alert and interactive.  Appears uncomfortable, holding emesis bag, no acute distress.  HENT: Atraumatic. Full AROM of neck. MMM.  Cardiovascular: Regular rate and rhythm.   Chest/Pulmonary: Normal work of breathing. Speaking in complete sentences. Lungs CTAB. No chest wall tenderness or deformities.  Abdomen: Soft, obese.  Tenderness palpation left lower quadrant.  Extremities: Normal AROM of all major joints.  Skin: Warm and dry. Normal skin color.   Neuro: Speech clear. CNs grossly intact. Moves all extremities spontaneously.   Psych: Normal affect/mood, cooperative, memory appropriate.      LAB  Labs Ordered and Resulted from Time of ED Arrival to Time of ED Departure   CBC WITH PLATELETS - Abnormal       Result Value    WBC Count 16.0 (*)     RBC Count 5.14      Hemoglobin 14.7      Hematocrit 43.0      MCV 84      MCH 28.6      MCHC 34.2      RDW 12.8      Platelet Count 259     BASIC METABOLIC PANEL - Abnormal    Sodium 140      Potassium 4.1      Chloride 103      Carbon Dioxide (CO2) 24      Anion Gap 13      Urea Nitrogen 11.8      Creatinine 1.35 (*)     GFR Estimate 72      Calcium 9.2      Glucose 109 (*)    CRP INFLAMMATION - Abnormal    CRP Inflammation 15.60 (*)    ROUTINE UA WITH MICROSCOPIC REFLEX TO CULTURE - " Abnormal    Color Urine Light Yellow      Appearance Urine Clear      Glucose Urine Negative      Bilirubin Urine Negative      Ketones Urine Trace (*)     Specific Gravity Urine 1.015      Blood Urine 0.5 mg/dL (*)     pH Urine 6.0      Protein Albumin Urine 20 (*)     Urobilinogen Urine <2.0      Nitrite Urine Negative      Leukocyte Esterase Urine Negative      Mucus Urine Present (*)     RBC Urine 135 (*)     WBC Urine 2     HEPATIC FUNCTION PANEL - Normal    Protein Total 7.2      Albumin 4.5      Bilirubin Total 0.7      Alkaline Phosphatase 89      AST 29      ALT 57      Bilirubin Direct <0.20     LIPASE - Normal    Lipase 13     LACTIC ACID WHOLE BLOOD WITH 1X REPEAT IN 2 HR WHEN >2 - Normal    Lactic Acid, Initial 1.1     BASIC METABOLIC PANEL       RADIOLOGY  XR Chest Port 1 View   Final Result   IMPRESSION: Negative chest.      CT Abdomen Pelvis w Contrast   Final Result   IMPRESSION:    1.  Moderately obstructing 7 x 5 mm stone distal left ureter 4 cm from the ureterovesical junction.      2.  No other stones.          I, Lamont Diop, am serving as a scribe to document services personally performed by Dr. Brea Stern based on my observation and the provider's statements to me. I, Brea Stern MD attest that Lamont Diop is acting in a scribe capacity, has observed my performance of the services and has documented them in accordance with my direction.    Brea Stern M.D.  Emergency Medicine  [facility] EMERGENCY DEPARTMENT  [facility] [facility] [address]  [phone]  Dept: [phone]      Brea Stern MD  11/01/24 0107

## 2024-11-01 NOTE — PLAN OF CARE
Problem: Adult Inpatient Plan of Care  Goal: Absence of Hospital-Acquired Illness or Injury  Intervention: Identify and Manage Fall Risk  Recent Flowsheet Documentation  Taken 10/31/2024 2320 by Chauncey Perkins, RN  Safety Promotion/Fall Prevention: activity supervised   Goal Outcome Evaluation:        Pt A&0 X4 denies pain; no nausea or vomiting, started NPO at midnight, on tele with NSR. Up independently to the bathroom, Put on 3L O2 via NC. Care plan ongoing.    Chauncey JOHNSON, RN

## 2024-11-01 NOTE — DISCHARGE SUMMARY
"[facility]  Hospitalist Discharge Summary      Date of Admission:  10/31/2024  Date of Discharge:  11/1/2024  Discharging Provider: Prince Sanchez DO  Discharge Service: Hospitalist Service    Discharge Diagnoses   ANGELA (acute kidney injury) (H)  Left ureteral stone  Leukocytosis CRP elevated  Patient also has a BMI of 41 with possible history of sleep apnea    Clinically Significant Risk Factors     # Severe Obesity: Estimated body mass index is 41.64 kg/m  as calculated from the following:    Height as of this encounter: 2.032 m (6' 8\").    Weight as of this encounter: 171.9 kg (379 lb).       Follow-ups Needed After Discharge   Follow up with urology and PCP    Follow-up Appointments       Hospital to Primary Care - Establish PCP Referral      Please be aware that coverage of these services is subject to the terms and limitations of your health insurance plan.  Call member services at your health plan with any benefit or coverage questions.    Schedule Primary Care visit within: 30 Days             Unresulted Labs Ordered in the Past 30 Days of this Admission       No orders found for last 31 day(s).          Discharge Disposition   Discharged to home  Condition at discharge: Stable    Hospital Course   Yusef Camp is a 29-year-old male who presented with left flank pain, found to have ureteral stone, and ANGELA, was admitted to observation for IV fluids and urology consultation for possible stent placement.      ANGELA (acute kidney injury) (H)  Likely prerenal from dehydration, improved with IVF.     Left ureteral stone  Pain improved with treatment, urology consulted for possible stent placement, decision made to try medical therapy with flomax and multimodal flank pain management, will follow up with urology outpatient.      Leukocytosis CRP elevated  Did not notice any active infectious etiology, treated with pain control and supportive cares.      Patient also has a BMI of 41 with " possible history of sleep apnea  outpatient follow up with PCP for RASHAD    Consultations This Hospital Stay   UROLOGY IP CONSULT  UROLOGY IP CONSULT    Code Status   Full Code    Time Spent on this Encounter   I, Prince Sanchez DO, personally saw the patient today and spent greater than 30 minutes discharging this patient.       Prince Sanchez DO  [facility] EMERGENCY DEPARTMENT  [address] [facility] [address]  Phone: [phone]  ______________________________________________________________________    Physical Exam   Vital Signs: Temp: 98  F (36.7  C) Temp src: Oral BP: 122/77 Pulse: 93   Resp: 18 SpO2: 95 % O2 Device: None (Room air) Oxygen Delivery: 3 LPM  Weight: 379 lbs 0 oz  GEN: in no acute distress, alert and answers questions appropriately  HEENT: Moist mucus membranes, anicteric sclerae  NECK: symmetric without tracheal deviation  CV: reg rhythm, normal rate, audible s1 and s2, no murmurs / rubs / gallops  RESP: clear to auscultation, no rhonchi / rales / wheezes  ABD: non-distended  EXT: no peripheral edema  SKIN: no suspicious skin findings, warm  NEURO: moves all four extremities, no focal deficits       Primary Care Physician   Physician No Ref-Primary    Discharge Orders      Hospital to Primary Care - Establish PCP Referral      Reason for your hospital stay    ANGELA, kidney stone     Activity    Your activity upon discharge: activity as tolerated     Diet    Follow this diet upon discharge: Current Diet:Orders Placed This Encounter      NPO per Anesthesia Guidelines for Procedure/Surgery Except for: Meds       Significant Results and Procedures   Most Recent 3 CBC's:  Recent Labs   Lab Test 11/01/24  0849 10/31/24  1939   WBC 9.7 16.0*   HGB 13.7 14.7   MCV 86 84    259     Most Recent 3 BMP's:  Recent Labs   Lab Test 11/01/24  0849 10/31/24  1939    140   POTASSIUM 4.1 4.1   CHLORIDE 107 103   CO2 25 24   BUN 10.3 11.8   CR 1.11 1.35*    ANIONGAP 9 13   NATALIA 8.5* 9.2   * 109*   ,   Results for orders placed or performed during the hospital encounter of 10/31/24   CT Abdomen Pelvis w Contrast    Narrative    EXAM: CT ABDOMEN PELVIS W CONTRAST  LOCATION: [facility]  DATE: 10/31/2024    INDICATION: LLQ and flank pain  COMPARISON: None.  TECHNIQUE: CT scan of the abdomen and pelvis was performed following injection of IV contrast. Multiplanar reformats were obtained. Dose reduction techniques were used.  CONTRAST: isovue 370 100ml    FINDINGS:   LOWER CHEST: Normal.    HEPATOBILIARY: Mild diffuse hepatic steatosis.    PANCREAS: Normal.    SPLEEN: Normal.    ADRENAL GLANDS: Normal.    KIDNEYS/BLADDER: Moderately obstructing 7 x 5 mm stone lower ureter 4 cm from the ureteral vesicle junction. No other stones on either side.    BOWEL: Normal.    LYMPH NODES: Normal.    VASCULATURE: Normal.    PELVIC ORGANS: Normal.    MUSCULOSKELETAL: Normal.      Impression    IMPRESSION:   1.  Moderately obstructing 7 x 5 mm stone distal left ureter 4 cm from the ureterovesical junction.    2.  No other stones.   XR Chest Port 1 View    Narrative    EXAM: XR CHEST PORT 1 VIEW  LOCATION: [facility]  DATE: 10/31/2024    INDICATION: CRP  WBC high   r o PNA  COMPARISON: None.      Impression    IMPRESSION: Negative chest.       Discharge Medications   Current Discharge Medication List        START taking these medications    Details   ondansetron (ZOFRAN ODT) 4 MG ODT tab Take 1 tablet (4 mg) by mouth every 6 hours as needed for nausea or vomiting.  Qty: 15 tablet, Refills: 0    Associated Diagnoses: Nausea and vomiting, unspecified vomiting type      tamsulosin (FLOMAX) 0.4 MG capsule Take 1 capsule (0.4 mg) by mouth daily.  Qty: 30 capsule, Refills: 0    Associated Diagnoses: Ureteral stone      traMADol (ULTRAM) 50 MG tablet Take 1 tablet (50 mg) by mouth every 6 hours as needed for severe pain.  Qty: 20 tablet,  Refills: 0    Associated Diagnoses: Abdominal pain, left lower quadrant           CONTINUE these medications which have NOT CHANGED    Details   albuterol (PROAIR HFA/PROVENTIL HFA/VENTOLIN HFA) 108 (90 Base) MCG/ACT inhaler Inhale 2 puffs into the lungs every 6 hours as needed for shortness of breath, wheezing or cough.      esomeprazole (NEXIUM) 40 MG DR capsule Take 40 mg by mouth every morning (before breakfast). Take 30-60 minutes before eating.      ibuprofen (ADVIL/MOTRIN) 400 MG tablet Take 400 mg by mouth every 8 hours as needed for moderate pain.      WIXELA INHUB 500-50 MCG/ACT inhaler Inhale 1 puff into the lungs every morning.           Allergies   No Known Allergies

## 2024-11-01 NOTE — H&P
[facility]    History and Physical - Hospitalist Service       Date of Admission:  10/31/2024    Assessment & Plan      Yusef Camp is a 29 year old male admitted on 10/31/2024.   Principal Problem:    ANGELA (acute kidney injury) (H)  Active Problems:    Ureteral stone    Acute cystitis without hematuria    Abdominal pain, left lower quadrant    Ureterolithiasis    Nausea and vomiting, unspecified vomiting type    Morbid obesity (H)    Patient is a pleasant 29-year-old male fairly active at his baseline and presented with left flank pain on CT noted to have left ureteral stone.  He also has ANGELA no urinary symptoms per se of burning and a UA does not suggest active UTI, his CRP is high he has leukocytosis no GI symptoms other than the stone no respiratory symptoms, possibly stress-induced-should have further workup for trending the CRP down the line at this point no indication for antibiotic.    CT with moderately obstructing 7 x 5 mm stone distal left ureter 4 cm from the ureterovesical junction. I suspect this to be etiology of patient's symptoms.   Spoke with [place] [facility], Dr. Cortez.       ANGELA (acute kidney injury) (H)  Can be prerenal from dehydration or from obstructive obstruction, given fluids and will monitor and further workup based on progression    Left ureteral stone  Urology notified-  Will start Flomax-  Multimodal flank pain management    Leukocytosis CRP elevated  Did not notice any active infectious etiology, pain control supportive cares and establish with primary care for ongoing workup and follow-ups-    Patient also has a BMI of 41 with possible history of sleep apnea his saturations were borderline at the time of evaluation, no distress, can be some of pain medication side effects will continue pulse oximetry and chest x-ray normal will also get EKG. likely has sleep apnea and may need some oxygen overnight in lieu of this, he did report that he was on CPAP at some  "point.  Should establish with primary care.    Initial orders were placed.  Request consultation to urology-appreciated assistance and recommendations. .  Anticipated length of stay of <2 midnights of hospitalization depending on progression, planning,findings,further testing or treatment needs. Services are medically necessary for evaluation and treatment of the acute & on chronic superimposed conditions with the treatment plan as outlined above.  Current problem list also has been updated.          Diet: NPO per Anesthesia Guidelines for Procedure/Surgery Except for: Meds    DVT Prophylaxis: Low Risk/Ambulatory with no VTE prophylaxis indicated  Guajardo Catheter: Not present  Lines: None     Cardiac Monitoring: None  Code Status: Full Code      Clinically Significant Risk Factors Present on Admission                           # Severe Obesity: Estimated body mass index is 41.64 kg/m  as calculated from the following:    Height as of this encounter: 2.032 m (6' 8\").    Weight as of this encounter: 171.9 kg (379 lb).              Disposition Plan     Medically Ready for Discharge: Anticipated Tomorrow           Arben Eason MD  Hospitalist Service  [facility]  Securely message with [employer] (more info)  Text page via [employer] Paging/Directory     ______________________________________________________________________    Chief Complaint   Flank pain    History is obtained from the patient, electronic health record, and emergency department physician    History of Present Illness   Yusef Camp is a 29 year old male  presents with abdominal pain that is on his LLQ near his waistline that began around 2:40 PM. The pain is \"jabbing\" and he has nausea with vomiting x2. He describes the pain as \"jabbing\" and shoots to his left testicle and radiates around to his left flank. He was given 2x Tylenol 500 mg at 5:30 PM but vomited them up. He felt fine yesterday.      No new foods. No history of " kidney stones. No abdominal surgical history. No known allergies. No testicular swelling or redness. He denies fever, hematuria, dysuria, chest pain, shortness of breath, or any other complaints at this time.        Past medical history possible sleep apnea  Past Surgical History   No past surgical history on file.    Prior to Admission Medications   Prior to Admission Medications   Prescriptions Last Dose Informant Patient Reported? Taking?   WIXELA INHUB 500-50 MCG/ACT inhaler 10/31/2024 Morning  Yes Yes   Sig: Inhale 1 puff into the lungs every morning.   albuterol (PROAIR HFA/PROVENTIL HFA/VENTOLIN HFA) 108 (90 Base) MCG/ACT inhaler Past Week  Yes Yes   Sig: Inhale 2 puffs into the lungs every 6 hours as needed for shortness of breath, wheezing or cough.   esomeprazole (NEXIUM) 40 MG DR capsule 10/31/2024 Morning  Yes Yes   Sig: Take 40 mg by mouth every morning (before breakfast). Take 30-60 minutes before eating.   ibuprofen (ADVIL/MOTRIN) 400 MG tablet Past Month  Yes Yes   Sig: Take 400 mg by mouth every 8 hours as needed for moderate pain.      Facility-Administered Medications: None        Review of Systems    The 5 point Review of Systems is negative other than noted in the HPI or here.     Social History   I have reviewed this patient's social history and updated it with pertinent information if needed.         Family History     Reports family history of kidney stones in father and some heart disease in paternal side      Allergies   No Known Allergies     Physical Exam   Vital Signs: Temp: 98.3  F (36.8  C) Temp src: Oral BP: 135/72 Pulse: 93   Resp: 20 SpO2: 94 % O2 Device: None (Room air)    Weight: 379 lbs 0 oz    Alert awake  Vision Baseline  Neck supple  Oral mucosa moist  bilateral air entry heard,  S1-S2 normal  Abdomen is soft no tenderness  Extremities are fully mobile and there is no visible swelling noted  No skin cyanosis  Neurologically- no new Gross deficits from baseline-Moving all 4  extremities  Psych-mood okay and appropriate to circumstances      Medical Decision Making       75 MINUTES SPENT BY ME on the date of service doing chart review, history, exam, documentation & further activities per the note.      Data     I have personally reviewed the following data over the past 24 hrs:    16.0 (H)  \   14.7   / 259     140 103 11.8 /  109 (H)   4.1 24 1.35 (H) \     ALT: 57 AST: 29 AP: 89 TBILI: 0.7   ALB: 4.5 TOT PROTEIN: 7.2 LIPASE: 13     Procal: N/A CRP: 15.60 (H) Lactic Acid: 1.1         Imaging results reviewed over the past 24 hrs:   Recent Results (from the past 24 hours)   CT Abdomen Pelvis w Contrast    Narrative    EXAM: CT ABDOMEN PELVIS W CONTRAST  LOCATION: [facility]  DATE: 10/31/2024    INDICATION: LLQ and flank pain  COMPARISON: None.  TECHNIQUE: CT scan of the abdomen and pelvis was performed following injection of IV contrast. Multiplanar reformats were obtained. Dose reduction techniques were used.  CONTRAST: isovue 370 100ml    FINDINGS:   LOWER CHEST: Normal.    HEPATOBILIARY: Mild diffuse hepatic steatosis.    PANCREAS: Normal.    SPLEEN: Normal.    ADRENAL GLANDS: Normal.    KIDNEYS/BLADDER: Moderately obstructing 7 x 5 mm stone lower ureter 4 cm from the ureteral vesicle junction. No other stones on either side.    BOWEL: Normal.    LYMPH NODES: Normal.    VASCULATURE: Normal.    PELVIC ORGANS: Normal.    MUSCULOSKELETAL: Normal.      Impression    IMPRESSION:   1.  Moderately obstructing 7 x 5 mm stone distal left ureter 4 cm from the ureterovesical junction.    2.  No other stones.   XR Chest Port 1 View    Narrative    EXAM: XR CHEST PORT 1 VIEW  LOCATION: [facility]  DATE: 10/31/2024    INDICATION: CRP  WBC high   r o PNA  COMPARISON: None.      Impression    IMPRESSION: Negative chest.

## 2024-11-01 NOTE — MEDICATION SCRIBE - ADMISSION MEDICATION HISTORY
Medication Scribe Admission Medication History    Admission medication history is complete. The information provided in this note is only as accurate as the sources available at the time of the update.    Information Source(s): Patient and CareEverywhere/SureScripts via in-person    Pertinent Information: Patient manages his own medications. Patient reports only being prescribed inhalers. Takes Nexium daily OTC.    Wixela  -pt reports only using 1 puff daily in the AM.    Changes made to PTA medication list:  Added:   Wixela inhaler  Albuterol inhaler (rescue)  Esomeprazole 40mg  Deleted: None  Changed: None    Allergies reviewed with patient and updates made in EHR: yes    Medication History Completed By: Albert Valenzuela 10/31/2024 9:52 PM    PTA Med List   Medication Sig Last Dose/Taking    albuterol (PROAIR HFA/PROVENTIL HFA/VENTOLIN HFA) 108 (90 Base) MCG/ACT inhaler Inhale 2 puffs into the lungs every 6 hours as needed for shortness of breath, wheezing or cough. Past Week    esomeprazole (NEXIUM) 40 MG DR capsule Take 40 mg by mouth every morning (before breakfast). Take 30-60 minutes before eating. 10/31/2024 Morning    ibuprofen (ADVIL/MOTRIN) 400 MG tablet Take 400 mg by mouth every 8 hours as needed for moderate pain. Past Month    WIXELA INHUB 500-50 MCG/ACT inhaler Inhale 1 puff into the lungs every morning. 10/31/2024 Morning

## 2024-11-02 LAB
ATRIAL RATE - MUSE: 88 BPM
DIASTOLIC BLOOD PRESSURE - MUSE: 72 MMHG
INTERPRETATION ECG - MUSE: NORMAL
P AXIS - MUSE: 38 DEGREES
PR INTERVAL - MUSE: 160 MS
QRS DURATION - MUSE: 90 MS
QT - MUSE: 364 MS
QTC - MUSE: 440 MS
R AXIS - MUSE: 103 DEGREES
SYSTOLIC BLOOD PRESSURE - MUSE: 142 MMHG
T AXIS - MUSE: 37 DEGREES
VENTRICULAR RATE- MUSE: 88 BPM

## 2024-11-04 ENCOUNTER — TRANSCRIBE ORDERS (OUTPATIENT)
Dept: CALL CENTER | Age: 29
End: 2024-11-04

## 2024-11-04 DIAGNOSIS — R69 DIAGNOSIS UNKNOWN: Primary | ICD-10-CM

## 2024-11-06 ENCOUNTER — VIRTUAL VISIT (OUTPATIENT)
Dept: UROLOGY | Facility: CLINIC | Age: 29
End: 2024-11-06

## 2024-11-06 DIAGNOSIS — N20.1 URETERAL STONE: Primary | ICD-10-CM

## 2024-11-06 PROCEDURE — 99204 OFFICE O/P NEW MOD 45 MIN: CPT | Mod: 95 | Performed by: STUDENT IN AN ORGANIZED HEALTH CARE EDUCATION/TRAINING PROGRAM

## 2024-11-06 NOTE — NURSING NOTE
Current patient location: [facility] [address]  [facility] [address]    Is the patient currently in the state of [place]? YES    Visit mode:VIDEO    If the visit is dropped, the patient can be reconnected by: VIDEO VISIT: Text to cell phone:   Telephone Information:   Mobile [phone]       Will anyone else be joining the visit? NO  (If patient encounters technical issues they should call [phone])    Are changes needed to the allergy or medication list? Pt stated no med changes    Are refills needed on medications prescribed by this physician? NO    Rooming Documentation:  Not applicable    Reason for visit: Consult (New kidney stone - moderately obstructing 7x5 mm stone lower ureter 4cm from the ureteral vesicle junction )    Piedad PALMER

## 2024-11-06 NOTE — PROGRESS NOTES
Virtual Visit Details    Type of service:  Video Visit  Video start time: 3:38 PM  Video end time: 3:47 PM    Originating Location (pt. Location): Home    Distant Location (provider location):  On-site  Platform used for Video Visit: Ashish        Chief Complaint:    Kidney/Ureteral Stone           Consult or Referral:     Mr. Yusef Camp is a 29 year old male seen at the request of Dr. Lund.         History of Present Illness:    Yusef Camp is a very pleasant 29 year old male who presents with a history of Kidney/Ureteral Stone.      Reviewed previous notes from Dr. Leena Holbrook presents today for evaluation of a recent admission for obstructing left distal ureteral stone  He was in the ER on 31st October with his significant pain  Seems to be doing much better at this time and pain is very well-controlled with Advil or ibuprofen  Feels that the stone is moving  He is increased amount of fluid that he drinks on a daily basis and is hopeful that he will pass the stone           Past Medical History:   No past medical history on file.         Past Surgical History:   No past surgical history on file.         Medications     Current Outpatient Medications   Medication Sig Dispense Refill    albuterol (PROAIR HFA/PROVENTIL HFA/VENTOLIN HFA) 108 (90 Base) MCG/ACT inhaler Inhale 2 puffs into the lungs every 6 hours as needed for shortness of breath, wheezing or cough.      esomeprazole (NEXIUM) 40 MG DR capsule Take 40 mg by mouth every morning (before breakfast). Take 30-60 minutes before eating.      ibuprofen (ADVIL/MOTRIN) 400 MG tablet Take 400 mg by mouth every 8 hours as needed for moderate pain.      ondansetron (ZOFRAN ODT) 4 MG ODT tab Take 1 tablet (4 mg) by mouth every 6 hours as needed for nausea or vomiting. 15 tablet 0    tamsulosin (FLOMAX) 0.4 MG capsule Take 1 capsule (0.4 mg) by mouth daily. 30 capsule 0    traMADol (ULTRAM) 50 MG tablet Take 1 tablet (50 mg) by mouth every 6  hours as needed for severe pain. 20 tablet 0    WIXELA INHUB 500-50 MCG/ACT inhaler Inhale 1 puff into the lungs every morning.       No current facility-administered medications for this visit.            Family History:   No family history on file.         Social History:     Social History     Socioeconomic History    Marital status:      Spouse name: Not on file    Number of children: Not on file    Years of education: Not on file    Highest education level: Not on file   Occupational History    Not on file   Tobacco Use    Smoking status: Every Day     Types: Vaping Device    Smokeless tobacco: Never   Vaping Use    Vaping status: Every Day    Substances: Nicotine   Substance and Sexual Activity    Alcohol use: Not on file    Drug use: Not on file    Sexual activity: Not on file   Other Topics Concern    Not on file   Social History Narrative    Not on file     Social Drivers of Health     Financial Resource Strain: Not on file   Food Insecurity: Not on file   Transportation Needs: Not on file   Physical Activity: Not on file   Stress: Not on file   Social Connections: Not on file   Interpersonal Safety: Not on file   Housing Stability: Not on file            Allergies:   Patient has no known allergies.         Review of Systems:  From intake questionnaire     Skin: negative  Eyes: negative  Ears/Nose/Throat: negative  Respiratory: No shortness of breath, dyspnea on exertion, cough, or hemoptysis  Cardiovascular: No chest pain or palpitations  Gastrointestinal: negative; no nausea/vomiting, constipation or diarrhea  Genitourinary: as per HPI  Musculoskeletal: negative  Neurologic: negative  Psychiatric: negative  Hematologic/Lymphatic/Immunologic: negative  Endocrine: negative         Physical Exam:   This is a virtual visit    Alert, no acute distress, oriented, conversant    Ears/nose/mouth: mouth:normal, good dentition  Respiratory: no respiratory distress, or pursed lip breathing  Cardiovascular:no  "obvious jugular venous distension present  Skin: no suspicious lesions or rashes on Visible body parts on the Screen  Neuro: Alert, oriented, speech and mentation normal  Psych: affect and mood normal, alert and oriented to person, place and time      Labs and Pathology:    The following labs were reviewed by me and discussed with the patient:  Component      Latest Ref Rng 10/31/2024  9:27 PM   Color Urine      Colorless, Straw, Light Yellow, Yellow  Light Yellow    Appearance Urine      Clear  Clear    Glucose Urine      Negative mg/dL Negative    Bilirubin Urine      Negative  Negative    Ketones Urine      Negative mg/dL Trace !    Specific Gravity Urine      1.001 - 1.030  1.015    Blood Urine      Negative  0.5 mg/dL !    pH Urine      5.0 - 7.0  6.0    Protein Albumin Urine      Negative mg/dL 20 !    Urobilinogen mg/dL      <2.0 mg/dL <2.0    Nitrite Urine      Negative  Negative    Leukocyte Esterase Urine      Negative  Negative    Mucus Urine      None Seen /LPF Present !    RBC Urine      <=2 / (H)    WBC Urine      <=5 /HPF 2       Legend:  ! Abnormal  (H) High  Significant for   Lab Results   Component Value Date    CR 1.11 11/01/2024    CR 1.35 10/31/2024     No results found for: \"PSA\"          Imaging:    The following imaging exams were independently viewed and interpreted by me and discussed with patient:  EXAM: CT ABDOMEN PELVIS W CONTRAST  LOCATION: [facility]  DATE: 10/31/2024     INDICATION: LLQ and flank pain  COMPARISON: None.  TECHNIQUE: CT scan of the abdomen and pelvis was performed following injection of IV contrast. Multiplanar reformats were obtained. Dose reduction techniques were used.  CONTRAST: isovue 370 100ml     FINDINGS:   LOWER CHEST: Normal.     HEPATOBILIARY: Mild diffuse hepatic steatosis.     PANCREAS: Normal.     SPLEEN: Normal.     ADRENAL GLANDS: Normal.     KIDNEYS/BLADDER: Moderately obstructing 7 x 5 mm stone lower ureter 4 cm from the " ureteral vesicle junction. No other stones on either side.     BOWEL: Normal.     LYMPH NODES: Normal.     VASCULATURE: Normal.     PELVIC ORGANS: Normal.     MUSCULOSKELETAL: Normal.                                                                      IMPRESSION:   1.  Moderately obstructing 7 x 5 mm stone distal left ureter 4 cm from the ureterovesical junction.     2.  No other stones.             Assessment and Plan:     Assessment:     Ureteral stone  Discussed in detail about the ureteral stone and the possibility of spontaneous passage  He is on tamsulosin and should continue with the same  Discussed with him that the reasonable wait time.  Is between 3 to 4 weeks after which if he cannot document the passage of the stone we would recommend a CT to ensure that he still has the stone  If he does then we would have to consider for ureteroscopy to remove the stone followed by stent placement at the same time  We discussed about the details of the procedure including stent placement and use of laser to fragment the stone and a follow-up for stent removal either by himself or in the office  At this time he would prefer to continue with medical management which I strongly agree  Continue with tamsulosin  CT orders have been placed if he cannot pass the stone in 4 weeks  - CT Abdomen Pelvis w/o Contrast; Future    Plan:  Continue tamsulosin  CT in 4 weeks if unable to document stone passage  If he can pass the stone he should bring it for stone analysis    Orders  No orders of the defined types were placed in this encounter.        Justyn Cartagena MD  [facility]                  ==========================    Additional Billing and Coding Information:  Review of external notes as documented above   Review of the result(s) of each unique test - CT abdomen pelvis, UA, creatinine                12 minutes spent by me on the date of the encounter doing chart review, review of test  results, interpretation of tests, patient visit, and documentation     ==========================

## 2025-02-12 ENCOUNTER — APPOINTMENT (OUTPATIENT)
Dept: CT IMAGING | Facility: HOSPITAL | Age: 30
End: 2025-02-12
Attending: EMERGENCY MEDICINE
Payer: COMMERCIAL

## 2025-02-12 ENCOUNTER — ANESTHESIA (OUTPATIENT)
Dept: SURGERY | Facility: HOSPITAL | Age: 30
End: 2025-02-12
Payer: COMMERCIAL

## 2025-02-12 ENCOUNTER — HOSPITAL ENCOUNTER (EMERGENCY)
Facility: HOSPITAL | Age: 30
Discharge: HOME | End: 2025-02-12
Attending: EMERGENCY MEDICINE | Admitting: EMERGENCY MEDICINE
Payer: COMMERCIAL

## 2025-02-12 ENCOUNTER — ANESTHESIA EVENT (OUTPATIENT)
Dept: SURGERY | Facility: HOSPITAL | Age: 30
End: 2025-02-12
Payer: COMMERCIAL

## 2025-02-12 VITALS
HEART RATE: 76 BPM | WEIGHT: 315 LBS | RESPIRATION RATE: 16 BRPM | OXYGEN SATURATION: 98 % | HEIGHT: 78 IN | TEMPERATURE: 97.5 F | BODY MASS INDEX: 36.45 KG/M2 | DIASTOLIC BLOOD PRESSURE: 60 MMHG | SYSTOLIC BLOOD PRESSURE: 129 MMHG

## 2025-02-12 DIAGNOSIS — N20.1 LEFT URETERAL STONE: ICD-10-CM

## 2025-02-12 DIAGNOSIS — N21.1 URETHRAL STONE: Primary | ICD-10-CM

## 2025-02-12 DIAGNOSIS — N35.912 BULBOUS URETHRAL STRICTURE: ICD-10-CM

## 2025-02-12 LAB
ALBUMIN UR-MCNC: NEGATIVE MG/DL
ANION GAP SERPL CALCULATED.3IONS-SCNC: 8 MMOL/L (ref 7–15)
APPEARANCE UR: CLEAR
BASOPHILS # BLD AUTO: 0.1 10E3/UL (ref 0–0.2)
BASOPHILS NFR BLD AUTO: 1 %
BILIRUB UR QL STRIP: NEGATIVE
BUN SERPL-MCNC: 14 MG/DL (ref 6–20)
CALCIUM SERPL-MCNC: 9.3 MG/DL (ref 8.8–10.4)
CHLORIDE SERPL-SCNC: 104 MMOL/L (ref 98–107)
COLOR UR AUTO: ABNORMAL
CREAT SERPL-MCNC: 1.16 MG/DL (ref 0.67–1.17)
EGFRCR SERPLBLD CKD-EPI 2021: 86 ML/MIN/1.73M2
EOSINOPHIL # BLD AUTO: 0.2 10E3/UL (ref 0–0.7)
EOSINOPHIL NFR BLD AUTO: 2 %
ERYTHROCYTE [DISTWIDTH] IN BLOOD BY AUTOMATED COUNT: 13.2 % (ref 10–15)
GLUCOSE SERPL-MCNC: 97 MG/DL (ref 70–99)
GLUCOSE UR STRIP-MCNC: NEGATIVE MG/DL
HCO3 SERPL-SCNC: 27 MMOL/L (ref 22–29)
HCT VFR BLD AUTO: 43.5 % (ref 40–53)
HGB BLD-MCNC: 14.3 G/DL (ref 13.3–17.7)
HGB UR QL STRIP: NEGATIVE
IMM GRANULOCYTES # BLD: 0 10E3/UL
IMM GRANULOCYTES NFR BLD: 0 %
KETONES UR STRIP-MCNC: NEGATIVE MG/DL
LEUKOCYTE ESTERASE UR QL STRIP: NEGATIVE
LYMPHOCYTES # BLD AUTO: 2.2 10E3/UL (ref 0.8–5.3)
LYMPHOCYTES NFR BLD AUTO: 23 %
MCH RBC QN AUTO: 28.2 PG (ref 26.5–33)
MCHC RBC AUTO-ENTMCNC: 32.9 G/DL (ref 31.5–36.5)
MCV RBC AUTO: 86 FL (ref 78–100)
MONOCYTES # BLD AUTO: 0.7 10E3/UL (ref 0–1.3)
MONOCYTES NFR BLD AUTO: 7 %
NEUTROPHILS # BLD AUTO: 6.4 10E3/UL (ref 1.6–8.3)
NEUTROPHILS NFR BLD AUTO: 67 %
NITRATE UR QL: NEGATIVE
NRBC # BLD AUTO: 0 10E3/UL
NRBC BLD AUTO-RTO: 0 /100
PH UR STRIP: 6.5 [PH] (ref 5–7)
PLATELET # BLD AUTO: 270 10E3/UL (ref 150–450)
POTASSIUM SERPL-SCNC: 4.4 MMOL/L (ref 3.4–5.3)
RBC # BLD AUTO: 5.07 10E6/UL (ref 4.4–5.9)
RBC URINE: 1 /HPF
SODIUM SERPL-SCNC: 139 MMOL/L (ref 135–145)
SP GR UR STRIP: 1.03 (ref 1–1.03)
SQUAMOUS EPITHELIAL: <1 /HPF
UROBILINOGEN UR STRIP-MCNC: 4 MG/DL
WBC # BLD AUTO: 9.6 10E3/UL (ref 4–11)
WBC URINE: 2 /HPF

## 2025-02-12 PROCEDURE — 250N000011 HC RX IP 250 OP 636: Performed by: EMERGENCY MEDICINE

## 2025-02-12 PROCEDURE — 250N000009 HC RX 250: Performed by: UROLOGY

## 2025-02-12 PROCEDURE — 85048 AUTOMATED LEUKOCYTE COUNT: CPT | Performed by: EMERGENCY MEDICINE

## 2025-02-12 PROCEDURE — 81001 URINALYSIS AUTO W/SCOPE: CPT | Performed by: EMERGENCY MEDICINE

## 2025-02-12 PROCEDURE — 96374 THER/PROPH/DIAG INJ IV PUSH: CPT | Mod: 59

## 2025-02-12 PROCEDURE — 258N000003 HC RX IP 258 OP 636: Performed by: ANESTHESIOLOGY

## 2025-02-12 PROCEDURE — 99285 EMERGENCY DEPT VISIT HI MDM: CPT | Mod: 25

## 2025-02-12 PROCEDURE — 99214 OFFICE O/P EST MOD 30 MIN: CPT | Mod: 25 | Performed by: UROLOGY

## 2025-02-12 PROCEDURE — 250N000011 HC RX IP 250 OP 636: Performed by: NURSE ANESTHETIST, CERTIFIED REGISTERED

## 2025-02-12 PROCEDURE — 36415 COLL VENOUS BLD VENIPUNCTURE: CPT | Performed by: EMERGENCY MEDICINE

## 2025-02-12 PROCEDURE — 999N000141 HC STATISTIC PRE-PROCEDURE NURSING ASSESSMENT: Performed by: UROLOGY

## 2025-02-12 PROCEDURE — 250N000013 HC RX MED GY IP 250 OP 250 PS 637: Performed by: ANESTHESIOLOGY

## 2025-02-12 PROCEDURE — 80048 BASIC METABOLIC PNL TOTAL CA: CPT | Performed by: EMERGENCY MEDICINE

## 2025-02-12 PROCEDURE — 360N000075 HC SURGERY LEVEL 2, PER MIN: Performed by: UROLOGY

## 2025-02-12 PROCEDURE — 52281 CYSTOSCOPY AND TREATMENT: CPT | Performed by: UROLOGY

## 2025-02-12 PROCEDURE — C1758 CATHETER, URETERAL: HCPCS | Performed by: UROLOGY

## 2025-02-12 PROCEDURE — 272N000001 HC OR GENERAL SUPPLY STERILE: Performed by: UROLOGY

## 2025-02-12 PROCEDURE — 250N000013 HC RX MED GY IP 250 OP 250 PS 637: Performed by: UROLOGY

## 2025-02-12 PROCEDURE — 250N000025 HC SEVOFLURANE, PER MIN: Performed by: UROLOGY

## 2025-02-12 PROCEDURE — 85004 AUTOMATED DIFF WBC COUNT: CPT | Performed by: EMERGENCY MEDICINE

## 2025-02-12 PROCEDURE — 82365 CALCULUS SPECTROSCOPY: CPT | Performed by: UROLOGY

## 2025-02-12 PROCEDURE — 710N000012 HC RECOVERY PHASE 2, PER MINUTE: Performed by: UROLOGY

## 2025-02-12 PROCEDURE — 370N000017 HC ANESTHESIA TECHNICAL FEE, PER MIN: Performed by: UROLOGY

## 2025-02-12 PROCEDURE — 710N000009 HC RECOVERY PHASE 1, LEVEL 1, PER MIN: Performed by: UROLOGY

## 2025-02-12 PROCEDURE — 82565 ASSAY OF CREATININE: CPT | Performed by: EMERGENCY MEDICINE

## 2025-02-12 PROCEDURE — 74177 CT ABD & PELVIS W/CONTRAST: CPT

## 2025-02-12 PROCEDURE — 250N000009 HC RX 250: Performed by: NURSE ANESTHETIST, CERTIFIED REGISTERED

## 2025-02-12 PROCEDURE — 250N000011 HC RX IP 250 OP 636: Performed by: UROLOGY

## 2025-02-12 PROCEDURE — C1769 GUIDE WIRE: HCPCS | Performed by: UROLOGY

## 2025-02-12 PROCEDURE — 250N000011 HC RX IP 250 OP 636: Mod: JZ | Performed by: ANESTHESIOLOGY

## 2025-02-12 RX ORDER — TOLTERODINE 2 MG/1
2 CAPSULE, EXTENDED RELEASE ORAL DAILY PRN
Qty: 7 CAPSULE | Refills: 0 | Status: SHIPPED | OUTPATIENT
Start: 2025-02-12

## 2025-02-12 RX ORDER — ACETAMINOPHEN 325 MG/1
975 TABLET ORAL ONCE
Status: COMPLETED | OUTPATIENT
Start: 2025-02-12 | End: 2025-02-12

## 2025-02-12 RX ORDER — LIDOCAINE HYDROCHLORIDE 20 MG/ML
INJECTION, SOLUTION INFILTRATION; PERINEURAL PRN
Status: DISCONTINUED | OUTPATIENT
Start: 2025-02-12 | End: 2025-02-12

## 2025-02-12 RX ORDER — NALOXONE HYDROCHLORIDE 0.4 MG/ML
0.1 INJECTION, SOLUTION INTRAMUSCULAR; INTRAVENOUS; SUBCUTANEOUS
Status: DISCONTINUED | OUTPATIENT
Start: 2025-02-12 | End: 2025-02-12 | Stop reason: HOSPADM

## 2025-02-12 RX ORDER — OXYBUTYNIN CHLORIDE 5 MG/1
5 TABLET ORAL ONCE
Status: COMPLETED | OUTPATIENT
Start: 2025-02-12 | End: 2025-02-12

## 2025-02-12 RX ORDER — CEFAZOLIN SODIUM/WATER 3 G/30 ML
3 SYRINGE (ML) INTRAVENOUS
Status: COMPLETED | OUTPATIENT
Start: 2025-02-12 | End: 2025-02-12

## 2025-02-12 RX ORDER — OXYCODONE HYDROCHLORIDE 5 MG/1
10 TABLET ORAL
Status: COMPLETED | OUTPATIENT
Start: 2025-02-12 | End: 2025-02-12

## 2025-02-12 RX ORDER — IOPAMIDOL 755 MG/ML
90 INJECTION, SOLUTION INTRAVASCULAR ONCE
Status: COMPLETED | OUTPATIENT
Start: 2025-02-12 | End: 2025-02-12

## 2025-02-12 RX ORDER — SODIUM CHLORIDE, SODIUM LACTATE, POTASSIUM CHLORIDE, CALCIUM CHLORIDE 600; 310; 30; 20 MG/100ML; MG/100ML; MG/100ML; MG/100ML
INJECTION, SOLUTION INTRAVENOUS CONTINUOUS
Status: DISCONTINUED | OUTPATIENT
Start: 2025-02-12 | End: 2025-02-12 | Stop reason: HOSPADM

## 2025-02-12 RX ORDER — CEFAZOLIN SODIUM/WATER 3 G/30 ML
3 SYRINGE (ML) INTRAVENOUS SEE ADMIN INSTRUCTIONS
Status: DISCONTINUED | OUTPATIENT
Start: 2025-02-12 | End: 2025-02-12 | Stop reason: HOSPADM

## 2025-02-12 RX ORDER — ACETAMINOPHEN 650 MG/1
650 SUPPOSITORY RECTAL ONCE
Status: COMPLETED | OUTPATIENT
Start: 2025-02-12 | End: 2025-02-12

## 2025-02-12 RX ORDER — DEXAMETHASONE SODIUM PHOSPHATE 10 MG/ML
INJECTION, SOLUTION INTRAMUSCULAR; INTRAVENOUS PRN
Status: DISCONTINUED | OUTPATIENT
Start: 2025-02-12 | End: 2025-02-12

## 2025-02-12 RX ORDER — ACETAMINOPHEN 325 MG/1
975 TABLET ORAL ONCE
Status: DISCONTINUED | OUTPATIENT
Start: 2025-02-12 | End: 2025-02-12 | Stop reason: HOSPADM

## 2025-02-12 RX ORDER — DEXAMETHASONE SODIUM PHOSPHATE 4 MG/ML
4 INJECTION, SOLUTION INTRA-ARTICULAR; INTRALESIONAL; INTRAMUSCULAR; INTRAVENOUS; SOFT TISSUE
Status: DISCONTINUED | OUTPATIENT
Start: 2025-02-12 | End: 2025-02-12 | Stop reason: HOSPADM

## 2025-02-12 RX ORDER — OXYCODONE HYDROCHLORIDE 5 MG/1
5 TABLET ORAL
Status: DISCONTINUED | OUTPATIENT
Start: 2025-02-12 | End: 2025-02-12 | Stop reason: HOSPADM

## 2025-02-12 RX ORDER — PROPOFOL 10 MG/ML
INJECTION, EMULSION INTRAVENOUS PRN
Status: DISCONTINUED | OUTPATIENT
Start: 2025-02-12 | End: 2025-02-12

## 2025-02-12 RX ORDER — ONDANSETRON 2 MG/ML
INJECTION INTRAMUSCULAR; INTRAVENOUS PRN
Status: DISCONTINUED | OUTPATIENT
Start: 2025-02-12 | End: 2025-02-12

## 2025-02-12 RX ORDER — FENTANYL CITRATE 50 UG/ML
50 INJECTION, SOLUTION INTRAMUSCULAR; INTRAVENOUS EVERY 5 MIN PRN
Status: DISCONTINUED | OUTPATIENT
Start: 2025-02-12 | End: 2025-02-12 | Stop reason: HOSPADM

## 2025-02-12 RX ORDER — FENTANYL CITRATE 50 UG/ML
25 INJECTION, SOLUTION INTRAMUSCULAR; INTRAVENOUS
Status: COMPLETED | OUTPATIENT
Start: 2025-02-12 | End: 2025-02-12

## 2025-02-12 RX ORDER — ONDANSETRON 4 MG/1
4 TABLET, ORALLY DISINTEGRATING ORAL EVERY 30 MIN PRN
Status: DISCONTINUED | OUTPATIENT
Start: 2025-02-12 | End: 2025-02-12 | Stop reason: HOSPADM

## 2025-02-12 RX ORDER — ONDANSETRON 2 MG/ML
4 INJECTION INTRAMUSCULAR; INTRAVENOUS EVERY 30 MIN PRN
Status: DISCONTINUED | OUTPATIENT
Start: 2025-02-12 | End: 2025-02-12 | Stop reason: HOSPADM

## 2025-02-12 RX ORDER — FENTANYL CITRATE 50 UG/ML
INJECTION, SOLUTION INTRAMUSCULAR; INTRAVENOUS PRN
Status: DISCONTINUED | OUTPATIENT
Start: 2025-02-12 | End: 2025-02-12

## 2025-02-12 RX ORDER — HYDROMORPHONE HCL IN WATER/PF 6 MG/30 ML
0.2 PATIENT CONTROLLED ANALGESIA SYRINGE INTRAVENOUS EVERY 5 MIN PRN
Status: DISCONTINUED | OUTPATIENT
Start: 2025-02-12 | End: 2025-02-12 | Stop reason: HOSPADM

## 2025-02-12 RX ORDER — HYDROMORPHONE HCL IN WATER/PF 6 MG/30 ML
0.4 PATIENT CONTROLLED ANALGESIA SYRINGE INTRAVENOUS EVERY 5 MIN PRN
Status: DISCONTINUED | OUTPATIENT
Start: 2025-02-12 | End: 2025-02-12 | Stop reason: HOSPADM

## 2025-02-12 RX ORDER — FENTANYL CITRATE 50 UG/ML
25 INJECTION, SOLUTION INTRAMUSCULAR; INTRAVENOUS EVERY 5 MIN PRN
Status: DISCONTINUED | OUTPATIENT
Start: 2025-02-12 | End: 2025-02-12 | Stop reason: HOSPADM

## 2025-02-12 RX ORDER — LIDOCAINE 40 MG/G
CREAM TOPICAL
Status: DISCONTINUED | OUTPATIENT
Start: 2025-02-12 | End: 2025-02-12 | Stop reason: HOSPADM

## 2025-02-12 RX ORDER — KETOROLAC TROMETHAMINE 15 MG/ML
15 INJECTION, SOLUTION INTRAMUSCULAR; INTRAVENOUS ONCE
Status: COMPLETED | OUTPATIENT
Start: 2025-02-12 | End: 2025-02-12

## 2025-02-12 RX ADMIN — IOPAMIDOL 90 ML: 755 INJECTION, SOLUTION INTRAVENOUS at 13:00

## 2025-02-12 RX ADMIN — HYDROMORPHONE HYDROCHLORIDE 0.5 MG: 1 INJECTION, SOLUTION INTRAMUSCULAR; INTRAVENOUS; SUBCUTANEOUS at 18:13

## 2025-02-12 RX ADMIN — OXYBUTYNIN CHLORIDE 5 MG: 5 TABLET ORAL at 19:24

## 2025-02-12 RX ADMIN — ROCURONIUM 50 MG: 50 INJECTION, SOLUTION INTRAVENOUS at 17:47

## 2025-02-12 RX ADMIN — HYDROMORPHONE HYDROCHLORIDE 0.5 MG: 1 INJECTION, SOLUTION INTRAMUSCULAR; INTRAVENOUS; SUBCUTANEOUS at 18:20

## 2025-02-12 RX ADMIN — FENTANYL CITRATE 100 MCG: 50 INJECTION, SOLUTION INTRAMUSCULAR; INTRAVENOUS at 17:47

## 2025-02-12 RX ADMIN — MIDAZOLAM HYDROCHLORIDE 2 MG: 1 INJECTION, SOLUTION INTRAMUSCULAR; INTRAVENOUS at 17:41

## 2025-02-12 RX ADMIN — HYDROMORPHONE HYDROCHLORIDE 0.4 MG: 0.2 INJECTION, SOLUTION INTRAMUSCULAR; INTRAVENOUS; SUBCUTANEOUS at 19:15

## 2025-02-12 RX ADMIN — SODIUM CHLORIDE, POTASSIUM CHLORIDE, SODIUM LACTATE AND CALCIUM CHLORIDE: 600; 310; 30; 20 INJECTION, SOLUTION INTRAVENOUS at 17:41

## 2025-02-12 RX ADMIN — FENTANYL CITRATE 50 MCG: 50 INJECTION, SOLUTION INTRAMUSCULAR; INTRAVENOUS at 19:06

## 2025-02-12 RX ADMIN — ONDANSETRON 4 MG: 2 INJECTION INTRAMUSCULAR; INTRAVENOUS at 17:57

## 2025-02-12 RX ADMIN — FENTANYL CITRATE 50 MCG: 50 INJECTION, SOLUTION INTRAMUSCULAR; INTRAVENOUS at 18:57

## 2025-02-12 RX ADMIN — OXYCODONE HYDROCHLORIDE 10 MG: 5 TABLET ORAL at 19:56

## 2025-02-12 RX ADMIN — LIDOCAINE HYDROCHLORIDE 5 ML: 20 INJECTION, SOLUTION INFILTRATION; PERINEURAL at 17:47

## 2025-02-12 RX ADMIN — PROPOFOL 200 MG: 10 INJECTION, EMULSION INTRAVENOUS at 17:47

## 2025-02-12 RX ADMIN — ACETAMINOPHEN 975 MG: 325 TABLET ORAL at 15:31

## 2025-02-12 RX ADMIN — DEXAMETHASONE SODIUM PHOSPHATE 10 MG: 10 INJECTION, SOLUTION INTRAMUSCULAR; INTRAVENOUS at 17:56

## 2025-02-12 RX ADMIN — FENTANYL CITRATE 50 MCG: 50 INJECTION, SOLUTION INTRAMUSCULAR; INTRAVENOUS at 18:39

## 2025-02-12 RX ADMIN — HYDROMORPHONE HYDROCHLORIDE 0.4 MG: 0.2 INJECTION, SOLUTION INTRAMUSCULAR; INTRAVENOUS; SUBCUTANEOUS at 19:27

## 2025-02-12 RX ADMIN — FENTANYL CITRATE 25 MCG: 50 INJECTION, SOLUTION INTRAMUSCULAR; INTRAVENOUS at 15:28

## 2025-02-12 RX ADMIN — SUGAMMADEX 300 MG: 100 INJECTION, SOLUTION INTRAVENOUS at 18:25

## 2025-02-12 RX ADMIN — Medication 3 G: at 17:39

## 2025-02-12 RX ADMIN — KETOROLAC TROMETHAMINE 15 MG: 15 INJECTION, SOLUTION INTRAMUSCULAR; INTRAVENOUS at 10:42

## 2025-02-12 RX ADMIN — FENTANYL CITRATE 50 MCG: 50 INJECTION, SOLUTION INTRAMUSCULAR; INTRAVENOUS at 18:51

## 2025-02-12 ASSESSMENT — ACTIVITIES OF DAILY LIVING (ADL)
ADLS_ACUITY_SCORE: 41
ADLS_ACUITY_SCORE: 21
ADLS_ACUITY_SCORE: 18
ADLS_ACUITY_SCORE: 18
ADLS_ACUITY_SCORE: 21
ADLS_ACUITY_SCORE: 21
ADLS_ACUITY_SCORE: 18

## 2025-02-12 ASSESSMENT — COLUMBIA-SUICIDE SEVERITY RATING SCALE - C-SSRS
2. HAVE YOU ACTUALLY HAD ANY THOUGHTS OF KILLING YOURSELF IN THE PAST MONTH?: NO
6. HAVE YOU EVER DONE ANYTHING, STARTED TO DO ANYTHING, OR PREPARED TO DO ANYTHING TO END YOUR LIFE?: NO
1. IN THE PAST MONTH, HAVE YOU WISHED YOU WERE DEAD OR WISHED YOU COULD GO TO SLEEP AND NOT WAKE UP?: NO

## 2025-02-12 NOTE — ANESTHESIA PREPROCEDURE EVALUATION
Anesthesia Pre-Procedure Evaluation    Patient: Yusef Camp   MRN: [id number] : 1995        Procedure : Procedure(s):  CYSTOSCOPY, WITH CALCULUS REMOVAL and urethral dilation  urethral dilation  CYSTOSCOPY, WITH LITHOLAPAXY USING HOLMIUM LASER          No past medical history on file.   No past surgical history on file.   No Known Allergies   Social History     Tobacco Use    Smoking status: Every Day     Types: Vaping Device    Smokeless tobacco: Never   Substance Use Topics    Alcohol use: Not on file      Wt Readings from Last 1 Encounters:   25 (!) 170.1 kg (375 lb)        Anesthesia Evaluation   Pt has had prior anesthetic.     No history of anesthetic complications       ROS/MED HX  ENT/Pulmonary:     (+)     RASHAD risk factors, snores loudly,  obese,  observed stopped breathing,                              Neurologic:  - neg neurologic ROS     Cardiovascular:  - neg cardiovascular ROS     METS/Exercise Tolerance: 4 - Raking leaves, gardening    Hematologic:  - neg hematologic  ROS     Musculoskeletal:  - neg musculoskeletal ROS     GI/Hepatic:  - neg GI/hepatic ROS     Renal/Genitourinary:     (+)       Nephrolithiasis ,       Endo:     (+)               Obesity,       Psychiatric/Substance Use:     (+)     Recreational drug usage: Cannabis.    Infectious Disease:       Malignancy:  - neg malignancy ROS     Other:            Physical Exam    Airway        Mallampati: II   TM distance: > 3 FB   Neck ROM: full   Mouth opening: > 3 cm    Respiratory Devices and Support         Dental       (+) Minor Abnormalities - some fillings, tiny chips      Cardiovascular   cardiovascular exam normal          Pulmonary   pulmonary exam normal            Other findings: Large beard    OUTSIDE LABS:  CBC:   Lab Results   Component Value Date    WBC 9.6 2025    WBC 9.7 2024    HGB 14.3 2025    HGB 13.7 2024    HCT 43.5 2025    HCT 41.9 2024     2025    PLT  "237 11/01/2024     BMP:   Lab Results   Component Value Date     02/12/2025     11/01/2024    POTASSIUM 4.4 02/12/2025    POTASSIUM 4.1 11/01/2024    CHLORIDE 104 02/12/2025    CHLORIDE 107 11/01/2024    CO2 27 02/12/2025    CO2 25 11/01/2024    BUN 14.0 02/12/2025    BUN 10.3 11/01/2024    CR 1.16 02/12/2025    CR 1.11 11/01/2024    GLC 97 02/12/2025     (H) 11/01/2024     COAGS: No results found for: \"PTT\", \"INR\", \"FIBR\"  POC: No results found for: \"BGM\", \"HCG\", \"HCGS\"  HEPATIC:   Lab Results   Component Value Date    ALBUMIN 4.5 10/31/2024    PROTTOTAL 7.2 10/31/2024    ALT 57 10/31/2024    AST 29 10/31/2024    ALKPHOS 89 10/31/2024    BILITOTAL 0.7 10/31/2024     OTHER:   Lab Results   Component Value Date    LACT 1.1 10/31/2024    NATALIA 9.3 02/12/2025    LIPASE 13 10/31/2024       Anesthesia Plan    ASA Status:  2       Anesthesia Type: General.     - Airway: ETT   Induction: Intravenous.   Maintenance: Inhalation.        Consents    Anesthesia Plan(s) and associated risks, benefits, and realistic alternatives discussed. Questions answered and patient/representative(s) expressed understanding.     - Discussed: Risks, Benefits and Alternatives for the PROCEDURE were discussed     - Discussed with:       - Extended Intubation/Ventilatory Support Discussed: No.      - Patient is DNR/DNI Status: No     Use of blood products discussed: No .     Postoperative Care    Pain management: IV analgesics, Oral pain medications.   PONV prophylaxis: Ondansetron (or other 5HT-3), Dexamethasone or Solumedrol     Comments:               Sarah Wachter, MD    I have reviewed the pertinent notes and labs in the chart from the past 30 days and (re)examined the patient.  Any updates or changes from those notes are reflected in this note.    Clinically Significant Risk Factors Present on Admission                             # Severe Obesity: Estimated body mass index is 41.2 kg/m  as calculated from the following:    " "Height as of this encounter: 2.032 m (6' 8\").    Weight as of this encounter: 170.1 kg (375 lb).                "

## 2025-02-12 NOTE — ANESTHESIA PROCEDURE NOTES
Airway       Patient location during procedure: OR       Procedure Start/Stop Times: 2/12/2025 5:49 PM  Staff -        CRNA: Ilya Duke APRN CRNA       Performed By: CRNAIndications and Patient Condition       Indications for airway management: robert-procedural       Induction type:intravenous       Mask difficulty assessment: 2 - vent by mask + OA or adjuvant +/- NMBA    Final Airway Details       Final airway type: endotracheal airway       Successful airway: ETT - single  Endotracheal Airway Details        ETT size (mm): 8.0       Cuffed: yes       Cuff volume (mL): 8       Successful intubation technique: direct laryngoscopy       DL Blade Type: Meza 2       Grade View of Cords: 1       Adjucts: stylet       Position: Right       Measured from: lips       Secured at (cm): 24       Bite block used: None    Post intubation assessment        Placement verified by: capnometry, equal breath sounds and chest rise        Number of attempts at approach: 1       Secured with: tape       Ease of procedure: easy       Dentition: Intact and Unchanged    Medication(s) Administered   Medication Administration Time: 2/12/2025 5:49 PM

## 2025-02-12 NOTE — CONSULTS
[facility] Consult Note    Name: Yusef Camp   MRN: [id number]  YOB: 1995    Assessment and Plan:  29 year old male with a 7 mm urethral stone that was previously left ureteral stone.  He is in urinary retention unable to void the stone out with symptoms for last 4 days..     1.  Ureteral stone  2.  Urinary retention    I discussed options for the patient, which would include bedside cystoscopy and stone removal versus doing this in the OR.  Operating room is likely favorable to reduce patient's discomfort as well as possibly treat any other pathology such as urethral stricture disease which could be explaining her lack of stone passage.    Discussed risk, benefits, terms to the procedure including bleeding, infection, need for intraoperative urethral stricture dilation and catheter placement.  Patient understands and agrees to proceed.    Plan:  - Will proceed to the OR for cystoscopy with stone removal with or without laser lithotripsy, as well as possible urethral dilation and catheter placement  - Patient can discharge from PACU after the procedure    Barron Arguello MD  February 12, 2025         Chief Complaint: Urethral stone and urinary retention    History of Present Illness:  Yusef Camp is a 29 year old male seen in consultation from Dr. Candelaria for discussion of a urethral stone and urinary retention.      Patient has a difficulty urinating for the last 4 days.  Urinary stream is just a trickle.  On October 31, 2024, patient was diagnosed with a 7 x 5 mm left distal ureteral stone.    No hematuria, dysuria or stone passage.    I reviewed internal labs, of which pertinent ones include:   Hemoglobin   Date Value Ref Range Status   02/12/2025 14.3 13.3 - 17.7 g/dL Final     Potassium   Date Value Ref Range Status   02/12/2025 4.4 3.4 - 5.3 mmol/L Final     Creatinine   Date Value Ref Range Status   02/12/2025 1.16 0.67 - 1.17 mg/dL Final     pH  "Urine   Date Value Ref Range Status   02/12/2025 6.5 5.0 - 7.0 Final       Calcium   Date Value Ref Range Status   02/12/2025 9.3 8.8 - 10.4 mg/dL Final     I personally reviewed patient's CT scan of the abdomen pelvis performed February 12, 2025 at [facility].  This demonstrates a 2 mm left lower pole nonobstructing renal stone.  He has a 7 mm stone in the bulbar urethra and a distended bladder.         Past Medical History:  No past medical history on file.         Past Surgical History:  No past surgical history on file.         Social History:  Social History     Tobacco Use    Smoking status: Every Day     Types: Vaping Device    Smokeless tobacco: Never   Vaping Use    Vaping status: Every Day    Substances: Nicotine            Family History:  No family history on file.         Allergies:  No Known Allergies         Medications:  Current Facility-Administered Medications   Medication Dose Route Frequency Provider Last Rate Last Admin    [Auto Hold] sodium chloride 0.9% BOLUS 1,000 mL  1,000 mL Intravenous Once Nataly Candelaria MD         Physical Exam:  B/P: 99/73, T: 98.4, P: 85, R: 16  Estimated body mass index is 41.2 kg/m  as calculated from the following:    Height as of this encounter: 2.032 m (6' 8\").    Weight as of this encounter: 170.1 kg (375 lb).  General Appearance Adult: Alert, no acute distress, oriented    Outside records:   I spent 0 minutes reviewing outside records.      "

## 2025-02-12 NOTE — ED NOTES
Notified provider that there are no exam rooms open in the waiting room for pt to lie down for a bladder US.

## 2025-02-12 NOTE — LETTER
"[facility] PHASE II  [address] [address]  Phone: [phone]  Fax: [phone]      REPORT OF WORK ABILITY    NOTE TO EMPLOYEE: You must promptly provide a copy of this report to your  employer or worker's compensation insurer, and Qualified Rehabilitation Consultant.    Date: 2/12/2025                     Employee Name: Yusef Camp         YOB: 1995  Medical Record Number: [id number]   Soc.Sec.No: (Not on file)  Employer: None                Date of Injury: ***  Managed Care Organization / Insurance Company Name: {:[id number]}    Diagnosis: ***  Work Related: {:436010::\"yes\"}     MMI: {:[id number]}   Permanent Partial Disability(PPD) likely: {:181729::\"unknown\"}    EMPLOYEE IS ABLE TO WORK: {:099525}     RESTRICTIONS IF ANY:     {[place] RESTRICTIONS:084906}    OTHER RESTRICTIONS: {:892582}    TREATMENT PLAN/NOTES: {[place] WORKABILITY ACTIVITIES:497318}.      {:358777}/***    Electronically signed  "

## 2025-02-12 NOTE — ED TRIAGE NOTES
"Patient c/o lower abdominal pain , unable to urinate \" dripping and it hurts to pee\" last good urine flow was 4 days ago. Patient stated \" 7mm kidney stone \".      Triage Assessment (Adult)       Row Name 02/12/25 0827          Triage Assessment    Airway WDL WDL        Respiratory WDL    Respiratory WDL WDL        Skin Circulation/Temperature WDL    Skin Circulation/Temperature WDL WDL        Cardiac WDL    Cardiac WDL WDL        Peripheral/Neurovascular WDL    Peripheral Neurovascular WDL WDL        Cognitive/Neuro/Behavioral WDL    Cognitive/Neuro/Behavioral WDL WDL                     "

## 2025-02-12 NOTE — ED PROVIDER NOTES
EMERGENCY DEPARTMENT ENCOUNTER      NAME: Yusef Camp  AGE: 29 year old male  YOB: 1995  MRN: [id number]  EVALUATION DATE & TIME: No admission date for patient encounter.    PCP: No Ref-Primary, Physician    ED PROVIDER: Nataly Candelaria M.D.      CHIEF COMPLAINT     Chief Complaint   Patient presents with    Abdominal Pain    Urinary Retention         FINAL IMPRESSION:     1. Urethral stone    2. Left ureteral stone          MEDICAL DECISION MAKING:     ED Course as of 02/12/25 1631   Wed Feb 12, 2025   0856 Mr. Camp 29-year-old male who presents here with inability to urinate he said he chose dribble.  He is known to have a 7 mm stone on the left since December.  States he was told it will pass.  Denies any trauma denies any perineal incontinence denies any back pain or leg numbness or tingling.   0857 Exam he is well-appearing gait is steady.  Abdomen is soft.   0857 Differential diagnosis include but not limited to renal failure spinal canal etiology BPH infection among others.   0857 Will start an IV will check labs will check urine we will reevaluate.   1051 Labs unremarkable including urine.  Patient at triage given the emergency department boarding crisis.  Had not been able to do a bladder scan.   1100 Patient updated.  He is on his phone.  Denies any needs.   1304 Ct abdomen and Pelvis independent interpreted by me reveals large bladder.  Formal read by radiologist revealed ureteral stone to be too close to the prostate..   1629 Neurologist was able to look at the CT.  He states he will take the patient to the operating room.  Patient has been NPO.  He is in agreement.   1629 Patient taken to the OR.  He drove himself to the emergency department informed that he will need a ride.  He said he does not have a ride.  Will need to assure safe disposition after surgery.       Medical Decision Making    History:  Supplemental history from: N/A  External Record(s) reviewed: Clinic 11/6/2024  urology kidney stone.    Work Up:  Chart documentation includes differential considered and any EKGs or imaging independently interpreted by provider, where specified.  In additional to work up documented, I considered the following work up: Antibiotics  External consultation:  Discussion of management with another provider: ELIZABETH Butler.     Complicating factors:  Care impacted by chronic illness: N/A      Disposition considerations: Admit.    MIPS Not Applicable        ED COURSE     8:53 AM I met with the patient, obtained history, performed an initial exam, and discussed options and plan for diagnostics and treatment here in the ED.   1:29 PM I am paging urology and updating the patient.   1:46 PM Spoke with ELIZABETH Butler. Plan to send patient to OR.       At the conclusion of the encounter I discussed the results of all of the tests and the disposition. The questions were answered. The patient acknowledged understanding and was agreeable with the care plan.         MEDICATIONS GIVEN IN THE EMERGENCY:     Medications   ceFAZolin Sodium (ANCEF) injection 3 g (has no administration in time range)   ceFAZolin Sodium (ANCEF) injection 3 g (has no administration in time range)   sodium chloride 0.9% BOLUS 1,000 mL ( Intravenous Auto Hold 2/12/25 1452)   lidocaine 1 % 0.1-1 mL (has no administration in time range)   lidocaine (LMX4) cream (has no administration in time range)   sodium chloride (PF) 0.9% PF flush 3 mL (has no administration in time range)   sodium chloride (PF) 0.9% PF flush 3 mL (has no administration in time range)   lactated ringers infusion (has no administration in time range)   acetaminophen (TYLENOL) tablet 975 mg (has no administration in time range)   ketorolac (TORADOL) injection 15 mg (15 mg Intravenous $Given 2/12/25 1042)   iopamidol (ISOVUE-370) solution 90 mL (90 mLs Intravenous $Given 2/12/25 1300)   acetaminophen (TYLENOL) tablet 975 mg (975 mg Oral $Given 2/12/25 1531)     Or  "  acetaminophen (TYLENOL) Suppository 650 mg ( Rectal See Alternative 2/12/25 1531)   fentaNYL (PF) (SUBLIMAZE) injection 25 mcg (25 mcg Intravenous $Given 2/12/25 1528)       NEW PRESCRIPTIONS STARTED AT TODAY'S ER VISIT     Current Discharge Medication List             =================================================================    HPI     Patient information was obtained from: the patient    Use of : N/A         Yusef Camp is a 29 year old male who presents by walk-in for evaluation of dysuria.     The patient was diagnosed with a 7 mm left ureteral stone in December of 2024. He was seen in the emergency department and told to return home with pain management. However, he has been unable to urinate and has intermittent left-sided groin pain since 2/8/2025.    He otherwise denies having hematuria, back pain, leg weakness, fever, chills, chest pain, shortness of breath or numbness or tingling to his groin. No recent falls.      REVIEW OF SYSTEMS   Review of Systems   SEE HPI    PAST MEDICAL HISTORY:   History reviewed. No pertinent past medical history.    PAST SURGICAL HISTORY:   History reviewed. No pertinent surgical history.      CURRENT MEDICATIONS:   No current outpatient medications on file.       ALLERGIES:   No Known Allergies    FAMILY HISTORY:   History reviewed. No pertinent family history.    SOCIAL HISTORY:     Social History     Socioeconomic History    Marital status:    Tobacco Use    Smoking status: Every Day     Types: Vaping Device    Smokeless tobacco: Never   Vaping Use    Vaping status: Every Day    Substances: Nicotine       VITALS:   /76 (BP Location: Right arm)   Pulse 76   Temp 98.3  F (36.8  C) (Oral)   Resp 18   Ht 2.032 m (6' 8\")   Wt (!) 170.1 kg (375 lb)   SpO2 93%   BMI 41.20 kg/m      PHYSICAL EXAM     Physical Exam  Vitals and nursing note reviewed. Exam conducted with a chaperone present.   Constitutional:       Appearance: He is " well-developed and normal weight. He is not ill-appearing, toxic-appearing or diaphoretic.   Neurological:      Mental Status: He is alert.         Physical Exam   Constitutional: Well-appearing with steady gait.     Head: Atraumatic.     Nose: Nose normal.     Mouth/Throat: Oropharynx is clear and moist.     Eyes: EOM are normal. Pupils are equal, round, and reactive to light.     Ears: Bilateral pearly white tympanic membranes.    Neck: Normal range of motion. Neck supple.     Cardiovascular: Normal rate, regular rhythm and normal heart sounds.  2+ femoral pulses/radial/DP pulses B    Pulmonary/Chest: Normal effort  and breath sounds normal.     Abdominal: soft nontender.    Musculoskeletal: Normal range of motion.     Neurological: Moves upper and lower extremities equally.    Lymphatics: no edema, no calves pain, no palpable cords.    : NA    Skin: Skin is warm and dry.     Psychiatric: Normal mood and affect. Behavior is normal.       LAB:     All pertinent labs reviewed and interpreted.  Labs Ordered and Resulted from Time of ED Arrival to Time of ED Departure   ROUTINE UA WITH MICROSCOPIC REFLEX TO CULTURE - Abnormal       Result Value    Color Urine Light Yellow      Appearance Urine Clear      Glucose Urine Negative      Bilirubin Urine Negative      Ketones Urine Negative      Specific Gravity Urine 1.026      Blood Urine Negative      pH Urine 6.5      Protein Albumin Urine Negative      Urobilinogen Urine 4.0 (*)     Nitrite Urine Negative      Leukocyte Esterase Urine Negative      RBC Urine 1      WBC Urine 2      Squamous Epithelials Urine <1     BASIC METABOLIC PANEL - Normal    Sodium 139      Potassium 4.4      Chloride 104      Carbon Dioxide (CO2) 27      Anion Gap 8      Urea Nitrogen 14.0      Creatinine 1.16      GFR Estimate 86      Calcium 9.3      Glucose 97     CBC WITH PLATELETS AND DIFFERENTIAL    WBC Count 9.6      RBC Count 5.07      Hemoglobin 14.3      Hematocrit 43.5      MCV 86       MCH 28.2      MCHC 32.9      RDW 13.2      Platelet Count 270      % Neutrophils 67      % Lymphocytes 23      % Monocytes 7      % Eosinophils 2      % Basophils 1      % Immature Granulocytes 0      NRBCs per 100 WBC 0      Absolute Neutrophils 6.4      Absolute Lymphocytes 2.2      Absolute Monocytes 0.7      Absolute Eosinophils 0.2      Absolute Basophils 0.1      Absolute Immature Granulocytes 0.0      Absolute NRBCs 0.0          RADIOLOGY:     Reviewed all pertinent imaging. Please see official radiology report.  CT Abdomen Pelvis w Contrast   Final Result   IMPRESSION:       1.  Relocation of prior 5 x 7 mm left ureteral stone to the proximal urethra, just past the prostate. No hydronephrosis. Urinary bladder is modestly distended.      2.  Stable 2 mm left renal nonobstructing stone.      3.  No other significant findings.              EKG:       I have independently reviewed and interpreted the EKG(s) documented above.      PROCEDURES:     Procedures      I, Kacey Mitchell, am serving as a scribe to document services personally performed by Dr. Candelaria based on my observation and the provider's statements to me. I, Nataly Candelaria MD attest that Kacey Mitchell is acting in a scribe capacity, has observed my performance of the services and has documented them in accordance with my direction.    Nataly Candelaria M.D.  Emergency Medicine  [facility] EMERGENCY DEPARTMENT  [facility] [address] [phone]  Dept: [phone]       Nataly Candelaria MD  02/12/25 8902

## 2025-02-12 NOTE — LETTER
[facility] PHASE II  [address] [address]  Phone: [phone]  Fax: [phone]    February 12, 2025        Yusef Camp  [address] [place] [address]          To whom it may concern:    RE: Yusef Camp    Patient received care today 2/12/25. No strenuous physical activity for a week.     Please contact me for questions or concerns.      Sincerely,      Dr. Arguello

## 2025-02-13 NOTE — BRIEF OP NOTE
[facility] Brief Operative Note    Pre-operative diagnosis: Urethral stone [N21.1]   Post-operative diagnosis bulbar urethral stricture; urethral stone    Procedure: Procedure(s):  CYSTOSCOPY, WITH CALCULUS REMOVAL  urethral dilation   Surgeon: Barron Arguello MD   Assistants(s): None   Estimated blood loss: 5 ml    Specimens: Urethral stone   Findings: 5 fr bulbar urethral stricture anterior and distal to old posterior false passage  7 mm stone behind the stricture, removed through sheath  Stricture dilated to 30 fr  20 fr [place] catheter placed with 10 ml in balloon

## 2025-02-13 NOTE — ANESTHESIA CARE TRANSFER NOTE
Patient: Yusef Camp    Procedure: Procedure(s):  CYSTOSCOPY, WITH CALCULUS REMOVAL  urethral dilation       Diagnosis: Urethral stone [N21.1]  Diagnosis Additional Information: No value filed.    Anesthesia Type:   General     Note:    Oropharynx: oropharynx clear of all foreign objects  Level of Consciousness: awake  Oxygen Supplementation: room air    Independent Airway: airway patency satisfactory and stable  Dentition: dentition unchanged  Vital Signs Stable: post-procedure vital signs reviewed and stable  Report to RN Given: handoff report given  Patient transferred to: PACU    Handoff Report: Identifed the Patient, Identified the Reponsible Provider, Reviewed the pertinent medical history, Discussed the surgical course, Reviewed Intra-OP anesthesia mangement and issues during anesthesia, Set expectations for post-procedure period and Allowed opportunity for questions and acknowledgement of understanding      Vitals:  Vitals Value Taken Time   /86 02/12/25 1830   Temp 36.3  C (97.34  F) 02/12/25 1832   Pulse 99 02/12/25 1832   Resp 17 02/12/25 1832   SpO2 97 % 02/12/25 1832   Vitals shown include unfiled device data.    Electronically Signed By: VITALIY Singh CRNA  February 12, 2025  6:33 PM

## 2025-02-13 NOTE — ANESTHESIA POSTPROCEDURE EVALUATION
Patient: Yusef Camp    Procedure: Procedure(s):  CYSTOSCOPY, WITH CALCULUS REMOVAL  urethral dilation       Anesthesia Type:  General    Note:  Disposition: Inpatient   Postop Pain Control: Uneventful            Sign Out: Well controlled pain   PONV:    Neuro/Psych: Uneventful            Sign Out: Acceptable/Baseline neuro status   Airway/Respiratory: Uneventful            Sign Out: Acceptable/Baseline resp. status   CV/Hemodynamics: Uneventful            Sign Out: Acceptable CV status; No obvious hypovolemia; No obvious fluid overload   Other NRE: NONE   DID A NON-ROUTINE EVENT OCCUR?            Last vitals:  Vitals Value Taken Time   /76 02/12/25 1845   Temp 36.3  C (97.34  F) 02/12/25 1850   Pulse 91 02/12/25 1850   Resp 13 02/12/25 1850   SpO2 100 % 02/12/25 1850   Vitals shown include unfiled device data.    Electronically Signed By: Juan Devi MD  February 12, 2025  6:51 PM

## 2025-02-13 NOTE — DISCHARGE INSTRUCTIONS
"Instructions on how to remove your catheter on Sunday 2/16 morning.  - Cut the port with a hard plastic connector, below the connector.  There is a black marker line where you should cut across.  - Fluid will start draining from the cut end.   - After the fluid is done draining, gently remove the catheter  - If you do not urinate within 4 hours, have the sensation you need to urinate but cannot, or have other concerns please reach out to us.    Activity  - Avoid saddle/straddling activities for 2 weeks  - Do not strain with bowel movements.  - Do not drive until you can press the brake pedal quickly and fully without pain.  - Do not operate a motor vehicle while taking narcotic pain medications.\      Post-Operative Symptom Control    While you recover from your procedure, you can take steps to ease your recovery.    Diet and Fluids:    Eating your normal diet is fine.  You don not have to \"flush\" your system.    Call the Clinic Immediately If You Have Any Of The Following Symptoms:   Nausea/vomiting that is uncontrolled with medications   You have a fever over 100.0   Chills   Are not able to urinate for 8 hours    Increasing back pain that is not relieved with pain medications   Large amounts of blood in urine or large clots    We can respond to your questions or concerns 24 hours a day at [phone].   For after hours phone calls please wait on call to be connected with the \"care connection\" service for after hours care.     Follow up:  We will have you follow-up in clinic in 6 weeks.  Please come to clinic with a full bladder.    Please complete your lab testing and 24 hr urine testing around the same time, at least 2 weeks prior to your follow up appointment.    "

## 2025-02-13 NOTE — OP NOTE
OPERATIVE REPORT    PREOPERATIVE DIAGNOSIS:   Urethral stone    POSTOPERATIVE DIAGNOSIS: Bulbar urethral stricture, urethral stone    PROCEDURES PERFORMED:   Cystoscopy with urethral dilation  Urethral stone removal  Urethral catheter placement over wire    STAFF SURGEON: Barron Arguello MD  ASSISTANT(S): None  ANESTHESIA: General  ESTIMATED BLOOD LOSS: 5 ml  COMPLICATIONS: None.   SPECIMEN: Urethral stone    SIGNIFICANT FINDINGS:   5 fr bulbar urethral stricture anterior and distal to old posterior false passage  7 mm stone behind the stricture, removed through sheath  Stricture dilated to 30 fr  20 fr [place] catheter placed with 10 ml in balloon    BRIEF OPERATIVE INDICATIONS: Yusef Camp is a(n) 29 year old male who presented with a prior left ureteral stone that is stuck in the anterior urethra and causing him urinary retention and discomfort.  After a discussion of all risks, benefits, and alternatives, the patient elected to proceed with cystoscopy with urethral stone removal and possible urethral dilation.    DESCRIPTION OF PROCEDURE:  After informed consent was obtained, the patient was transported to the operating room & placed supine on the table. After adequate anesthesia was induced, the patient was placed in lithotomy and prepped and draped in the usual sterile fashion. A timeout was taken to confirm correct patient, procedure and laterality. Pre-operative IV antibiotics were administered.     A 19 [place] cystoscope was inserted through the urethra.  The fossa navicularis and penile urethra was unremarkable.  In the bulbar urethra there is evidence of a prior healed false passage with what appeared to be a lumen that was not able to be cannulated with the scope.  Distal and anterior to this, there was a 4-5 [place] bulbar urethral stricture with pale scar at the central portion.    I used a sensor wire which would not pass through the area of the scar until I used the backing of a 5 [place]  open-ended catheter, presumably due to the urethral stone present.  I advanced the wire and the open-ended catheter into the bladder.  With removal of the wire there was return of urine so I replaced the wire and remove the scope and the 5 [place].    I sequentially dilated with Bates sounds from 12 [place] to 18 [place].  I then was able to navigate the scope through the urethra and noted that the urethral stricture was about 5 mm in length.  The sphincter coapted appropriately and the stone was present in the membranous urethra.  I pushed the stone into the bladder.  The prostate was also unremarkable.  The bladder mucosa was unremarkable.  The stone was able to come out through the sheath without lithotripsy.  This was sent for analysis.    I then dilated the stricture up to 30 [place].  Cystoscopy after the dilation showed the stricture was opened well.  I placed a 20 [place] tip catheter 10 mL in the balloon.    They were awakened from anesthesia and transferred to the PACU.       POSTOP PLAN:  Patient remove catheter in 3 days.  Can discharge today

## 2025-02-18 LAB
APPEARANCE STONE: NORMAL
COMPN STONE: NORMAL
SPECIMEN WT: 104 MG

## 2025-02-24 ENCOUNTER — TELEPHONE (OUTPATIENT)
Dept: UROLOGY | Facility: CLINIC | Age: 30
End: 2025-02-24
Payer: COMMERCIAL

## 2025-02-24 NOTE — TELEPHONE ENCOUNTER
Message left for patient to call central urology scheduling line for 6 weeks post stone removal and urethral dilation follow up.  He will need Uroflow and PVR at appt time.

## 2025-03-10 ENCOUNTER — TELEPHONE (OUTPATIENT)
Dept: UROLOGY | Facility: CLINIC | Age: 30
End: 2025-03-10
Payer: COMMERCIAL

## 2025-03-10 NOTE — TELEPHONE ENCOUNTER
Message left for patient to call back to clinic to set up an in person appointment to do PVR and Uroflow. Juli Reddy RN

## 2025-03-12 ENCOUNTER — TELEPHONE (OUTPATIENT)
Dept: UROLOGY | Facility: CLINIC | Age: 30
End: 2025-03-12
Payer: COMMERCIAL

## (undated) DEVICE — SOLUTION IRRIG 2B7127 .9NS 3000ML BAG

## (undated) DEVICE — TUBING IRRIG TUR Y TYPE 96" LF 6543-01

## (undated) DEVICE — GOWN XLG DISP 9545

## (undated) DEVICE — PREP DYNA-HEX 4% CHG SCRUB 4OZ BOTTLE MDS098710

## (undated) DEVICE — Device

## (undated) DEVICE — CUSTOM PACK CYSTO PREFERRED SOT5BCYHEA

## (undated) DEVICE — GUIDEWIRE SENSOR DUAL FLEX STR 0.035"X150CM M0066703080

## (undated) DEVICE — CATH URETERAL OPEN END 5FRX70CM M0064002010

## (undated) DEVICE — CATH URETERAL DUAL LUMEN 10FRX54CM M0064051000

## (undated) DEVICE — SOL WATER IRRIG 1000ML BOTTLE 2F7114

## (undated) DEVICE — TUBING SUCTION MEDI-VAC 1/4"X20' N620A

## (undated) DEVICE — MAT FLOOR WATERPROOF BACKSHEET FMBP30

## (undated) DEVICE — GOWN IMPERVIOUS BREATHABLE SMART XLG 89045

## (undated) DEVICE — SOL NACL 0.9% IRRIG 1000ML BOTTLE 2F7124

## (undated) DEVICE — SUCTION MANIFOLD NEPTUNE 2 SYS 1 PORT 702-025-000

## (undated) DEVICE — CATH FOLEY COUNCIL 20FR 5ML LUBRICATH LATEX 0196L20

## (undated) DEVICE — BAG URINARY DRAIN 2000ML LF 154002

## (undated) DEVICE — KIT ENDO FIRST STEP DISINFECTANT 200ML W/POUCH EP-4

## (undated) RX ORDER — FENTANYL CITRATE 50 UG/ML
INJECTION, SOLUTION INTRAMUSCULAR; INTRAVENOUS
Status: DISPENSED
Start: 2025-02-12

## (undated) RX ORDER — PROPOFOL 10 MG/ML
INJECTION, EMULSION INTRAVENOUS
Status: DISPENSED
Start: 2025-02-12

## (undated) RX ORDER — CEFAZOLIN SODIUM 1 G/3ML
INJECTION, POWDER, FOR SOLUTION INTRAMUSCULAR; INTRAVENOUS
Status: DISPENSED
Start: 2025-02-12